# Patient Record
Sex: FEMALE | Race: WHITE | NOT HISPANIC OR LATINO | Employment: OTHER | URBAN - METROPOLITAN AREA
[De-identification: names, ages, dates, MRNs, and addresses within clinical notes are randomized per-mention and may not be internally consistent; named-entity substitution may affect disease eponyms.]

---

## 2024-04-22 ENCOUNTER — OFFICE VISIT (OUTPATIENT)
Dept: PODIATRY | Facility: CLINIC | Age: 64
End: 2024-04-22
Payer: COMMERCIAL

## 2024-04-22 VITALS
WEIGHT: 166 LBS | HEIGHT: 63 IN | HEART RATE: 85 BPM | DIASTOLIC BLOOD PRESSURE: 82 MMHG | RESPIRATION RATE: 16 BRPM | SYSTOLIC BLOOD PRESSURE: 119 MMHG | BODY MASS INDEX: 29.41 KG/M2

## 2024-04-22 DIAGNOSIS — L60.9 ABNORMALITY OF NAIL SURFACE: ICD-10-CM

## 2024-04-22 DIAGNOSIS — L60.0 INGROWN NAIL OF GREAT TOE OF RIGHT FOOT: Primary | ICD-10-CM

## 2024-04-22 DIAGNOSIS — L60.8 INCURVATED NAIL: ICD-10-CM

## 2024-04-22 PROCEDURE — 99202 OFFICE O/P NEW SF 15 MIN: CPT | Mod: S$GLB,,, | Performed by: PODIATRIST

## 2024-04-22 PROCEDURE — 3074F SYST BP LT 130 MM HG: CPT | Mod: S$GLB,,, | Performed by: PODIATRIST

## 2024-04-22 PROCEDURE — 99999 PR PBB SHADOW E&M-NEW PATIENT-LVL IV: CPT | Mod: PBBFAC,,, | Performed by: PODIATRIST

## 2024-04-22 PROCEDURE — 1159F MED LIST DOCD IN RCRD: CPT | Mod: S$GLB,,, | Performed by: PODIATRIST

## 2024-04-22 PROCEDURE — 1160F RVW MEDS BY RX/DR IN RCRD: CPT | Mod: S$GLB,,, | Performed by: PODIATRIST

## 2024-04-22 PROCEDURE — 3079F DIAST BP 80-89 MM HG: CPT | Mod: S$GLB,,, | Performed by: PODIATRIST

## 2024-04-22 PROCEDURE — 3008F BODY MASS INDEX DOCD: CPT | Mod: S$GLB,,, | Performed by: PODIATRIST

## 2024-04-22 RX ORDER — CALCIUM CARBONATE 300MG(750)
400 TABLET,CHEWABLE ORAL
COMMUNITY
Start: 2023-12-26 | End: 2024-04-23 | Stop reason: SDUPTHER

## 2024-04-22 RX ORDER — SOTALOL HYDROCHLORIDE 120 MG/1
120 TABLET ORAL 2 TIMES DAILY
COMMUNITY
Start: 2023-12-16 | End: 2024-04-23

## 2024-04-22 RX ORDER — RIVAROXABAN 20 MG/1
20 TABLET, FILM COATED ORAL
COMMUNITY

## 2024-04-22 RX ORDER — SPIRONOLACTONE 25 MG/1
25 TABLET ORAL
COMMUNITY
Start: 2023-12-26 | End: 2024-04-23

## 2024-04-22 RX ORDER — SACUBITRIL AND VALSARTAN 24; 26 MG/1; MG/1
1 TABLET, FILM COATED ORAL 2 TIMES DAILY
COMMUNITY
Start: 2023-12-26

## 2024-04-22 RX ORDER — NALTREXONE HYDROCHLORIDE 50 MG/1
50 TABLET, FILM COATED ORAL
COMMUNITY
End: 2024-04-23

## 2024-04-22 RX ORDER — ASPIRIN 81 MG/1
81 TABLET ORAL
COMMUNITY
Start: 2023-12-26

## 2024-04-22 RX ORDER — PANTOPRAZOLE SODIUM 20 MG/1
20 TABLET, DELAYED RELEASE ORAL
COMMUNITY
Start: 2023-12-16 | End: 2024-04-23

## 2024-04-22 RX ORDER — METOPROLOL SUCCINATE 25 MG/1
TABLET, EXTENDED RELEASE ORAL
COMMUNITY
End: 2024-04-23 | Stop reason: SDUPTHER

## 2024-04-22 RX ORDER — DIGOXIN 125 MCG
0.12 TABLET ORAL
COMMUNITY
Start: 2024-01-04 | End: 2024-04-23

## 2024-04-22 RX ORDER — METOPROLOL SUCCINATE 25 MG/1
TABLET, EXTENDED RELEASE ORAL
COMMUNITY
Start: 2024-03-13

## 2024-04-22 RX ORDER — TRAZODONE HYDROCHLORIDE 50 MG/1
50 TABLET ORAL NIGHTLY PRN
COMMUNITY

## 2024-04-22 RX ORDER — SERTRALINE HYDROCHLORIDE 100 MG/1
100 TABLET, FILM COATED ORAL
COMMUNITY

## 2024-04-22 RX ORDER — AMIODARONE HYDROCHLORIDE 200 MG/1
400 TABLET ORAL
COMMUNITY
Start: 2023-12-26 | End: 2024-04-23

## 2024-04-22 RX ORDER — SUCRALFATE 1 G/1
1 TABLET ORAL 2 TIMES DAILY
COMMUNITY
Start: 2023-12-16 | End: 2024-04-23

## 2024-04-22 RX ORDER — FUROSEMIDE 20 MG/1
20 TABLET ORAL
COMMUNITY
Start: 2024-01-22

## 2024-04-22 RX ORDER — PROPAFENONE HYDROCHLORIDE 300 MG/1
300 TABLET, COATED ORAL 2 TIMES DAILY
COMMUNITY
Start: 2023-05-12 | End: 2024-04-23

## 2024-04-22 RX ORDER — LANOLIN ALCOHOL/MO/W.PET/CERES
1 CREAM (GRAM) TOPICAL 2 TIMES DAILY
COMMUNITY
Start: 2024-03-27

## 2024-04-24 NOTE — PROGRESS NOTES
Subjective:       Patient ID: Keri Baca is a 63 y.o. female.    Chief Complaint: Ingrown Toenail  Patient presents with complaint of ingrown nails right greater than left for about a year.  Does go for pedicures, right great toenail just seems to be get worse.  No pain unless pressure or in closed toed shoes, denies redness swelling or drainage    Past Medical History:   Diagnosis Date    A-fib     History of alcoholism     History of fracture of toe     Hypertension     Moderate obstructive sleep apnea     Palpitation     Plantar fasciitis     PLMD (periodic limb movement disorder)      Past Surgical History:   Procedure Laterality Date    APPENDECTOMY      CARDIOVERSION      2022-2023     SECTION       Family History   Problem Relation Name Age of Onset    Heart disease Mother      Heart disease Father      Cancer Sister       Social History     Socioeconomic History    Marital status:    Tobacco Use    Smoking status: Former     Types: Cigarettes    Smokeless tobacco: Never   Substance and Sexual Activity    Alcohol use: Not Currently    Drug use: Not Currently     Social Determinants of Health     Financial Resource Strain: Low Risk  (2021)    Received from [facility]    Overall Financial Resource Strain (CARDIA)     Difficulty of Paying Living Expenses: Not hard at all   Food Insecurity: No Food Insecurity (2021)    Received from [facility]    Hunger Vital Sign     Worried About Running Out of Food in the Last Year: Never true     Ran Out of Food in the Last Year: Never true   Transportation Needs: No Transportation Needs (2021)    Received from [facility]    PRAPARE - Transportation     Lack of Transportation (Medical): No     Lack of Transportation (Non-Medical): No   Physical Activity: Inactive (2021)    Received from [facility]  [facility]    Exercise Vital Sign     Days of Exercise per Week: 0 days     Minutes of Exercise per Session: 0 min   Stress: Stress Concern Present (6/25/2021)    Received from [facility]    [place] [employer] - Occupational Stress Questionnaire     Feeling of Stress : Very much   Social Connections: Socially Isolated (6/25/2021)    Received from [facility] and [facility]    Social Connection and Isolation Panel [NHANES]     Frequency of Communication with Friends and Family: More than three times a week     Frequency of Social Gatherings with Friends and Family: More than three times a week     Attends Mandaen Services: Never     Active Member of Clubs or Organizations: No     Attends Club or Organization Meetings: Never     Marital Status:        Current Outpatient Medications   Medication Sig Dispense Refill    aspirin (ECOTRIN) 81 MG EC tablet 81 mg.      magnesium oxide (MAG-OX) 400 mg (241.3 mg magnesium) tablet Take 1 tablet by mouth 2 (two) times daily.      metoprolol succinate (TOPROL-XL) 25 MG 24 hr tablet = 3 tab, Oral, Daily, # 270 tab, 0 Refill(s), Maintenance, Pharmacy: [facility] #[id number], 161, cm, 03/13/24 9:16:00 CDT, Height/Length Measured, 74.4, kg, 03/13/24 9:16:00 CDT, Weight Dosing      sertraline (ZOLOFT) 100 MG tablet Take 100 mg by mouth.      traZODone (DESYREL) 50 MG tablet Take 50 mg by mouth nightly as needed.      XARELTO 20 mg Tab Take 20 mg by mouth.      amiodarone (PACERONE) 200 MG Tab Take 400 mg by mouth. (Patient not taking: Reported on 4/22/2024)      digoxin (LANOXIN) 125 mcg tablet Take 0.125 mg by mouth. (Patient not taking: Reported on 4/22/2024)      ENTRESTO 24-26 mg per tablet Take 1 tablet by mouth 2 (two) times daily.      furosemide (LASIX) 20 MG tablet Take 20 mg by mouth. (Patient not taking: Reported on 4/22/2024)      magnesium oxide 400 mg magnesium Tab 400 mg.  "(Patient not taking: Reported on 4/22/2024)      metoprolol succinate (TOPROL-XL) 25 MG 24 hr tablet Take by mouth. (Patient not taking: Reported on 4/22/2024)      naltrexone (DEPADE) 50 mg tablet Take 50 mg by mouth. (Patient not taking: Reported on 4/22/2024)      pantoprazole (PROTONIX) 20 MG tablet Take 20 mg by mouth. (Patient not taking: Reported on 4/22/2024)      propafenone (RYTHMOL) 300 MG tablet Take 300 mg by mouth 2 (two) times daily. (Patient not taking: Reported on 4/22/2024)      sotaloL (BETAPACE) 120 MG Tab Take 120 mg by mouth 2 (two) times daily. (Patient not taking: Reported on 4/22/2024)      spironolactone (ALDACTONE) 25 MG tablet Take 25 mg by mouth. (Patient not taking: Reported on 4/22/2024)      sucralfate (CARAFATE) 1 gram tablet Take 1 g by mouth 2 (two) times daily. (Patient not taking: Reported on 4/22/2024)       No current facility-administered medications for this visit.     Review of patient's allergies indicates:  No Known Allergies    Review of Systems    Objective:      Vitals:    04/22/24 0940   BP: 119/82   Pulse: 85   Resp: 16   Weight: 75.3 kg (166 lb)   Height: 5' 3" (1.6 m)     Physical Exam       Assessment:       1. Ingrown nail of great toe of right foot    2. Abnormality of nail surface - Right Foot    3. Incurvated nail - Left Foot        Plan:         Reviewed with patient curvature of the nail plate affecting ingrown nail right great toe which has loosened up from the nail bed, this is caused cavity or space to develop the tip of the toenail contributing to worsening ingrown nails on both sides.  Advised to prevent recurrence maintenance to the nail needs to be done on a regular basis to try to change the shape of the nail plate  Showed patient how to reduce the thickness of the nail plate, file across the top every 3-4 days keeping the nail thickness reduced  Reviewed care and maintenance of the ingrown nail trimming the ingrown portions slightly, not aggressively " or deep.  Reviewed how they should be cared for when she goes for pedicure  Ingrown nail removed/debrided both borders right great hallux and mild medial left hallux, no bleeding or drainage present.  Triple antibiotic ointment, coban applied. Not a candidate for nail avulsion at this time.  Instructed patient to leave dressing intact until the morning  Soak warm water and Epson salt in the morning apply antibiotic ointment and a soft bandage leave on throughout the day, soak in the evening a 2nd time and leave to air out overnight.  We did discuss topicals to apply to the nail at night to help keep it soft.  This treatment should be done daily until any residual pain has resolved.  Once pain-free start Vicks vapor rub 2 to 3 times a day both big toenails continuing to reduce thickness of the nail plate for best results  Contact office with any changes or signs of infection  Patient was in understanding and agreement with treatment plan.  I counseled the patient on their conditions, implications and medical management.  Instructed patient to contact the office with any changes, questions, concerns, worsening of symptoms.   Total face to face time 20 minutes, exam, assessment, treatment, discussion, additional time for review of chart prior to and following appointment and visit documentation, consultation and coordination of care.   Follow up as needed    This note was created using M*Modal voice recognition software that occasionally misinterpreted phrases or words.

## 2024-06-21 ENCOUNTER — TELEPHONE (OUTPATIENT)
Dept: HEMATOLOGY/ONCOLOGY | Facility: CLINIC | Age: 64
End: 2024-06-21
Payer: COMMERCIAL

## 2024-06-21 NOTE — NURSING
Patient notified of the scheduled appointment with Dr. Sharma for 06/28/24.  Patient states that she is having a PET on 06/27/24.  Patient instructed to bring a copy of PET to appointment.  Previous CTs uploaded.

## 2024-06-26 NOTE — PROGRESS NOTES
History & Physical    Subjective     History of Present Illness:  Patient is a 63 y.o. female former smoker with NICM (EF 15% echo ) however recovered EF(60% this month), PAF (Xarelto), HTN, RAUL on CPAP here today for evaluation of AMBER NSCLC. Serial scans have shown persistence of a 2.8 cm part solid nodule. Bronchoscopy with biopsy positive for well differentiated adenocarcinoma. There is a calcified RUL nodule. PET completed yesterday, unable to review images. Referral to radiation. Sees Dr. Negro for oncology.     Former smoker. No alcohol presently.   PSH: cholecystectomy, cardioversion,      Chief Complaint   Patient presents with    Consult       Review of patient's allergies indicates:  No Known Allergies    Current Outpatient Medications   Medication Sig Dispense Refill    aspirin (ECOTRIN) 81 MG EC tablet 81 mg.      magnesium oxide (MAG-OX) 400 mg (241.3 mg magnesium) tablet Take 1 tablet by mouth 2 (two) times daily.      metoprolol succinate (TOPROL-XL) 25 MG 24 hr tablet = 3 tab, Oral, Daily, # 270 tab, 0 Refill(s), Maintenance, Pharmacy: [facility] #[id number], 161, cm, 24 9:16:00 CDT, Height/Length Measured, 74.4, kg, 24 9:16:00 CDT, Weight Dosing      sertraline (ZOLOFT) 100 MG tablet Take 100 mg by mouth.      traZODone (DESYREL) 50 MG tablet Take 50 mg by mouth nightly as needed.      XARELTO 20 mg Tab Take 20 mg by mouth.       No current facility-administered medications for this visit.       Past Medical History:   Diagnosis Date    A-fib     History of alcoholism     History of fracture of toe     Hypertension     Moderate obstructive sleep apnea     Palpitation     Plantar fasciitis     PLMD (periodic limb movement disorder)      Past Surgical History:   Procedure Laterality Date    APPENDECTOMY      CARDIOVERSION      2022-2023     SECTION       Family History   Problem Relation Name Age of Onset    Heart disease Mother      Heart disease  "Father      Cancer Sister       Social History     Tobacco Use    Smoking status: Former     Types: Cigarettes    Smokeless tobacco: Never   Substance Use Topics    Alcohol use: Not Currently    Drug use: Not Currently        Review of Systems:  Review of Systems   Constitutional:  Negative for activity change, appetite change and fatigue.   Respiratory:  Negative for cough and shortness of breath.    Cardiovascular:  Negative for palpitations and leg swelling.   Gastrointestinal:  Negative for abdominal pain.   Genitourinary:  Negative for difficulty urinating.   Musculoskeletal:  Negative for arthralgias.   Neurological:  Negative for dizziness and syncope.   Psychiatric/Behavioral:  Negative for agitation.           Objective     Vital Signs (Most Recent)  Vitals:    06/28/24 1120   BP: (!) 153/96   Pulse: 90   SpO2: 96%   Weight: 76.5 kg (168 lb 10.4 oz)   Height: 5' 3" (1.6 m)   PainSc: 0-No pain           Physical Exam:  Physical Exam  Constitutional:       Appearance: Normal appearance.   Cardiovascular:      Rate and Rhythm: Normal rate and regular rhythm.   Pulmonary:      Breath sounds: Normal breath sounds.   Abdominal:      Palpations: Abdomen is soft.   Musculoskeletal:         General: Normal range of motion.      Cervical back: Normal range of motion.      Right lower leg: No edema.      Left lower leg: No edema.   Skin:     General: Skin is warm and dry.   Neurological:      General: No focal deficit present.      Mental Status: She is alert and oriented to person, place, and time.   Psychiatric:         Mood and Affect: Mood normal.         Behavior: Behavior normal.         PFTS  FEV1: 2.17L  95%  DLCO: 16.8   81%         Outside PET - unable to upload     Assessment and Plan     Patient is a 63 y.o. female former smoker with NICM (EF 15% echo 2023) however recovered EF(60% this month), PAF (Xarelto), HTN, RAUL on CPAP here today for evaluation of AMBER NSCLC - well differentiated adenocarcinoma. "     PLAN:    Pre op labs today. Upload PET for review. If disease remains localized to nodule plan for left robotic assisted upper division with MLND, possible thoracotomy. Discuss in tumor board.   Appropriate patient education regarding the dianelys-operative period as well as intraoperative details were discussed. Risks, including but not limited to, bleeding, infection, pain and anesthetic complication were discussed. Patient was given the opportunity to ask questions and to have those questions answered to their satisfaction. Patient verbalized understanding to both procedure and associated risks. Consent was obtained.

## 2024-06-26 NOTE — H&P (VIEW-ONLY)
History & Physical    Subjective     History of Present Illness:  Patient is a 63 y.o. female former smoker with NICM (EF 15% echo ) however recovered EF(60% this month), PAF (Xarelto), HTN, RAUL on CPAP here today for evaluation of AMBER NSCLC. Serial scans have shown persistence of a 2.8 cm part solid nodule. Bronchoscopy with biopsy positive for well differentiated adenocarcinoma. There is a calcified RUL nodule. PET completed yesterday, unable to review images. Referral to radiation. Sees Dr. Negro for oncology.     Former smoker. No alcohol presently.   PSH: cholecystectomy, cardioversion,      Chief Complaint   Patient presents with    Consult       Review of patient's allergies indicates:  No Known Allergies    Current Outpatient Medications   Medication Sig Dispense Refill    aspirin (ECOTRIN) 81 MG EC tablet 81 mg.      magnesium oxide (MAG-OX) 400 mg (241.3 mg magnesium) tablet Take 1 tablet by mouth 2 (two) times daily.      metoprolol succinate (TOPROL-XL) 25 MG 24 hr tablet = 3 tab, Oral, Daily, # 270 tab, 0 Refill(s), Maintenance, Pharmacy: [facility] #[id number], 161, cm, 24 9:16:00 CDT, Height/Length Measured, 74.4, kg, 24 9:16:00 CDT, Weight Dosing      sertraline (ZOLOFT) 100 MG tablet Take 100 mg by mouth.      traZODone (DESYREL) 50 MG tablet Take 50 mg by mouth nightly as needed.      XARELTO 20 mg Tab Take 20 mg by mouth.       No current facility-administered medications for this visit.       Past Medical History:   Diagnosis Date    A-fib     History of alcoholism     History of fracture of toe     Hypertension     Moderate obstructive sleep apnea     Palpitation     Plantar fasciitis     PLMD (periodic limb movement disorder)      Past Surgical History:   Procedure Laterality Date    APPENDECTOMY      CARDIOVERSION      2022-2023     SECTION       Family History   Problem Relation Name Age of Onset    Heart disease Mother      Heart disease  "Father      Cancer Sister       Social History     Tobacco Use    Smoking status: Former     Types: Cigarettes    Smokeless tobacco: Never   Substance Use Topics    Alcohol use: Not Currently    Drug use: Not Currently        Review of Systems:  Review of Systems   Constitutional:  Negative for activity change, appetite change and fatigue.   Respiratory:  Negative for cough and shortness of breath.    Cardiovascular:  Negative for palpitations and leg swelling.   Gastrointestinal:  Negative for abdominal pain.   Genitourinary:  Negative for difficulty urinating.   Musculoskeletal:  Negative for arthralgias.   Neurological:  Negative for dizziness and syncope.   Psychiatric/Behavioral:  Negative for agitation.           Objective     Vital Signs (Most Recent)  Vitals:    06/28/24 1120   BP: (!) 153/96   Pulse: 90   SpO2: 96%   Weight: 76.5 kg (168 lb 10.4 oz)   Height: 5' 3" (1.6 m)   PainSc: 0-No pain           Physical Exam:  Physical Exam  Constitutional:       Appearance: Normal appearance.   Cardiovascular:      Rate and Rhythm: Normal rate and regular rhythm.   Pulmonary:      Breath sounds: Normal breath sounds.   Abdominal:      Palpations: Abdomen is soft.   Musculoskeletal:         General: Normal range of motion.      Cervical back: Normal range of motion.      Right lower leg: No edema.      Left lower leg: No edema.   Skin:     General: Skin is warm and dry.   Neurological:      General: No focal deficit present.      Mental Status: She is alert and oriented to person, place, and time.   Psychiatric:         Mood and Affect: Mood normal.         Behavior: Behavior normal.         PFTS  FEV1: 2.17L  95%  DLCO: 16.8   81%         Outside PET - unable to upload     Assessment and Plan     Patient is a 63 y.o. female former smoker with NICM (EF 15% echo 2023) however recovered EF(60% this month), PAF (Xarelto), HTN, RAUL on CPAP here today for evaluation of AMBER NSCLC - well differentiated adenocarcinoma. "     PLAN:    Pre op labs today. Upload PET for review. If disease remains localized to nodule plan for left robotic assisted upper division with MLND, possible thoracotomy. Discuss in tumor board.   Appropriate patient education regarding the dianelys-operative period as well as intraoperative details were discussed. Risks, including but not limited to, bleeding, infection, pain and anesthetic complication were discussed. Patient was given the opportunity to ask questions and to have those questions answered to their satisfaction. Patient verbalized understanding to both procedure and associated risks. Consent was obtained.

## 2024-06-28 ENCOUNTER — OFFICE VISIT (OUTPATIENT)
Dept: CARDIOTHORACIC SURGERY | Facility: CLINIC | Age: 64
End: 2024-06-28
Payer: COMMERCIAL

## 2024-06-28 VITALS
OXYGEN SATURATION: 96 % | HEART RATE: 90 BPM | WEIGHT: 168.63 LBS | SYSTOLIC BLOOD PRESSURE: 153 MMHG | HEIGHT: 63 IN | DIASTOLIC BLOOD PRESSURE: 96 MMHG | BODY MASS INDEX: 29.88 KG/M2

## 2024-06-28 DIAGNOSIS — Z01.818 PRE-OP EVALUATION: Primary | ICD-10-CM

## 2024-06-28 DIAGNOSIS — C34.12 MALIGNANT NEOPLASM OF UPPER LOBE OF LEFT LUNG: Primary | ICD-10-CM

## 2024-06-28 DIAGNOSIS — C34.92 NON-SMALL CELL CANCER OF LEFT LUNG: ICD-10-CM

## 2024-06-28 DIAGNOSIS — D68.9 COAGULOPATHY: ICD-10-CM

## 2024-06-28 DIAGNOSIS — C34.12 MALIGNANT NEOPLASM OF UPPER LOBE OF LEFT LUNG: ICD-10-CM

## 2024-06-28 PROBLEM — C34.10 MALIGNANT NEOPLASM OF UPPER LOBE OF LUNG: Status: ACTIVE | Noted: 2024-06-28

## 2024-06-28 PROCEDURE — 99999 PR PBB SHADOW E&M-EST. PATIENT-LVL III: CPT | Mod: PBBFAC,,, | Performed by: THORACIC SURGERY (CARDIOTHORACIC VASCULAR SURGERY)

## 2024-07-01 ENCOUNTER — TELEPHONE (OUTPATIENT)
Dept: CARDIOTHORACIC SURGERY | Facility: CLINIC | Age: 64
End: 2024-07-01
Payer: COMMERCIAL

## 2024-07-01 NOTE — TELEPHONE ENCOUNTER
Called and left message for Dr Balderas's nurse to see if they are able to schedule a PET stress or Lexiscan in [place]. Left detailed message with callback number.    Julie Haase RN  [facility] Nurse Navigator [phone]

## 2024-07-01 NOTE — TELEPHONE ENCOUNTER
Spoke with Radha in main lab at [facility]. She asked if I would re-fax orders for lab work to main lab number, [phone]. Orders re faxed.    Julie Haase RN  [facility] Nurse Navigator [phone]

## 2024-07-01 NOTE — TELEPHONE ENCOUNTER
Spoke with May from Dr Balderas's office. Discussed need for PET stress or Lexiscan prior to surgery on 7/23. She will discuss with Dr Balderas to see if they are able to order and perform the imaging and will call me back.    Julie Haase RN  [facility] Nurse Navigator [phone]

## 2024-07-02 ENCOUNTER — DOCUMENTATION ONLY (OUTPATIENT)
Dept: CARDIOTHORACIC SURGERY | Facility: CLINIC | Age: 64
End: 2024-07-02
Payer: COMMERCIAL

## 2024-07-02 ENCOUNTER — TELEPHONE (OUTPATIENT)
Dept: CARDIOTHORACIC SURGERY | Facility: CLINIC | Age: 64
End: 2024-07-02
Payer: COMMERCIAL

## 2024-07-02 NOTE — TELEPHONE ENCOUNTER
Called pt to find out the location and date of her PET scan. She had the scan on 6/27 and brought the disc to her appointment. Scan was completed at [facility].    Julie Haase RN  [facility] Nurse Navigator [phone]     Pharmacy did not receive previous refill.

## 2024-07-02 NOTE — TELEPHONE ENCOUNTER
Asked Dr Balderas's office to fax echo. Gave  updated number. Left message for his nurse to call and let me know if they are able to complete a PET stress. They will call me back.    Julie Haase RN  [facility] Nurse Navigator [phone]

## 2024-07-02 NOTE — PROGRESS NOTES
Spoke with Radha in lab at [facility] to follow up on patient's lab orders. They have not received pt's lab orders. Re-faxed orders.    Julie Haase RN  [facility] Nurse Navigator [phone]

## 2024-07-08 ENCOUNTER — TELEPHONE (OUTPATIENT)
Dept: CARDIOTHORACIC SURGERY | Facility: CLINIC | Age: 64
End: 2024-07-08
Payer: COMMERCIAL

## 2024-07-08 DIAGNOSIS — C34.12 MALIGNANT NEOPLASM OF UPPER LOBE OF LEFT LUNG: Primary | ICD-10-CM

## 2024-07-08 NOTE — TELEPHONE ENCOUNTER
Called to see if patient has been scheduled for stress test. Pt has been scheduled via Dr Balderas's office for 7/30/2024 in [place].    Julie Haase RN  [facility] Nurse Navigator [phone]

## 2024-07-08 NOTE — TELEPHONE ENCOUNTER
Spoke with patient and let her know if she would like to keep surgery on 7/23, we are able to do the stress test here on 7/17. If should would prefer to do the testing closer to her home, we will move the surgery to early August. She would prefer to do testing at home. Will discuss surgery dates with Dr Sharma and get back with patient.    Pt also asked about labwork. Have confirmed with Latasha at [facility] lab that they have orders. I told her they have the orders at the outpatient lab for her. She will go and get blood drawn.    Julie Haase RN  [facility] Nurse Navigator [phone]        ----- Message from Reina Colvin sent at 7/8/2024  3:56 PM CDT -----  Regarding: pt advice  Contact: pt [phone]  Type:  Patient Returning Call    Who Called:pt   Who Left Message for Patient:Norma  Does the patient know what this is regarding?:Stress Test   Would the patient rather a call back or a response via [facility]? Callback   Best Call Back Number:[phone]

## 2024-07-11 DIAGNOSIS — Z01.810 PRE-OPERATIVE CARDIOVASCULAR EXAMINATION: Primary | ICD-10-CM

## 2024-07-12 ENCOUNTER — TELEPHONE (OUTPATIENT)
Dept: CARDIOLOGY | Facility: HOSPITAL | Age: 64
End: 2024-07-12

## 2024-07-15 ENCOUNTER — CLINICAL SUPPORT (OUTPATIENT)
Dept: CARDIOLOGY | Facility: HOSPITAL | Age: 64
End: 2024-07-15
Attending: PHYSICIAN ASSISTANT
Payer: COMMERCIAL

## 2024-07-15 ENCOUNTER — HOSPITAL ENCOUNTER (OUTPATIENT)
Dept: RADIOLOGY | Facility: HOSPITAL | Age: 64
Discharge: HOME | End: 2024-07-15
Attending: PHYSICIAN ASSISTANT
Payer: COMMERCIAL

## 2024-07-15 VITALS — HEIGHT: 63 IN | WEIGHT: 168.63 LBS | BODY MASS INDEX: 29.88 KG/M2

## 2024-07-15 DIAGNOSIS — Z01.810 PRE-OPERATIVE CARDIOVASCULAR EXAMINATION: ICD-10-CM

## 2024-07-15 LAB
CV PHARM DOSE: 0.4 MG
CV STRESS BASE HR: 87 BPM
DIASTOLIC BLOOD PRESSURE: 86 MMHG
OHS CV CPX 1 MINUTE RECOVERY HEART RATE: 97 BPM
OHS CV CPX 85 PERCENT MAX PREDICTED HEART RATE MALE: 133
OHS CV CPX MAX PREDICTED HEART RATE: 157
OHS CV CPX PATIENT IS FEMALE: 1
OHS CV CPX PATIENT IS MALE: 0
OHS CV CPX PEAK DIASTOLIC BLOOD PRESSURE: 99 MMHG
OHS CV CPX PEAK HEAR RATE: 132 BPM
OHS CV CPX PEAK RATE PRESSURE PRODUCT: NORMAL
OHS CV CPX PEAK SYSTOLIC BLOOD PRESSURE: 153 MMHG
OHS CV CPX PERCENT MAX PREDICTED HEART RATE ACHIEVED: 88
OHS CV CPX RATE PRESSURE PRODUCT PRESENTING: NORMAL
SYSTOLIC BLOOD PRESSURE: 151 MMHG

## 2024-07-15 PROCEDURE — 93017 CV STRESS TEST TRACING ONLY: CPT

## 2024-07-15 PROCEDURE — 93018 CV STRESS TEST I&R ONLY: CPT | Mod: ,,, | Performed by: GENERAL PRACTICE

## 2024-07-15 PROCEDURE — 63600175 PHARM REV CODE 636 W HCPCS: Performed by: PHYSICIAN ASSISTANT

## 2024-07-15 PROCEDURE — 78452 HT MUSCLE IMAGE SPECT MULT: CPT | Mod: TC

## 2024-07-15 PROCEDURE — A9502 TC99M TETROFOSMIN: HCPCS | Performed by: PHYSICIAN ASSISTANT

## 2024-07-15 PROCEDURE — 93306 TTE W/DOPPLER COMPLETE: CPT

## 2024-07-15 PROCEDURE — 93016 CV STRESS TEST SUPVJ ONLY: CPT | Mod: ,,, | Performed by: GENERAL PRACTICE

## 2024-07-15 PROCEDURE — 93306 TTE W/DOPPLER COMPLETE: CPT | Mod: 26,,, | Performed by: GENERAL PRACTICE

## 2024-07-15 RX ORDER — REGADENOSON 0.08 MG/ML
0.4 INJECTION, SOLUTION INTRAVENOUS ONCE
Status: COMPLETED | OUTPATIENT
Start: 2024-07-15 | End: 2024-07-15

## 2024-07-15 RX ADMIN — TETROFOSMIN 11 MILLICURIE: 1.38 INJECTION, POWDER, LYOPHILIZED, FOR SOLUTION INTRAVENOUS at 08:07

## 2024-07-15 RX ADMIN — REGADENOSON 0.4 MG: 0.08 INJECTION, SOLUTION INTRAVENOUS at 09:07

## 2024-07-15 RX ADMIN — TETROFOSMIN 24 MILLICURIE: 1.38 INJECTION, POWDER, LYOPHILIZED, FOR SOLUTION INTRAVENOUS at 09:07

## 2024-07-15 NOTE — NURSING NOTE
Echocardiogram and Lexiscan portion of Stress Test completed without complications. Patient verbalized understanding of pre-post test instructions. Discharged from lab per Cardiology.

## 2024-07-16 LAB
AORTIC ROOT ANNULUS: 2.3 CM
AORTIC VALVE CUSP SEPERATION: 1.8 CM
APICAL FOUR CHAMBER EJECTION FRACTION: 51 %
ASCENDING AORTA: 3 CM
AV INDEX (PROSTH): 0.81
AV MEAN GRADIENT: 2 MMHG
AV PEAK GRADIENT: 3 MMHG
AV VALVE AREA BY VELOCITY RATIO: 2.7 CM²
AV VALVE AREA: 2.56 CM²
AV VELOCITY RATIO: 0.86
BSA FOR ECHO PROCEDURE: 1.84 M2
CV ECHO LV RWT: 0.4 CM
DOP CALC AO PEAK VEL: 0.86 M/S
DOP CALC AO VTI: 17.8 CM
DOP CALC LVOT AREA: 3.1 CM2
DOP CALC LVOT DIAMETER: 2 CM
DOP CALC LVOT PEAK VEL: 0.74 M/S
DOP CALC LVOT STROKE VOLUME: 45.53 CM3
DOP CALC MV VTI: 20.3 CM
DOP CALCLVOT PEAK VEL VTI: 14.5 CM
E WAVE DECELERATION TIME: 130 MSEC
E/A RATIO: 2.42
E/E' RATIO: 7.36 M/S
ECHO LV POSTERIOR WALL: 0.89 CM (ref 0.6–1.1)
FRACTIONAL SHORTENING: 23 % (ref 28–44)
INTERVENTRICULAR SEPTUM: 0.85 CM (ref 0.6–1.1)
IVC DIAMETER: 2.05 CM
LEFT ATRIUM AREA SYSTOLIC (APICAL 4 CHAMBER): 19.3 CM2
LEFT ATRIUM SIZE: 3.5 CM
LEFT INTERNAL DIMENSION IN SYSTOLE: 3.43 CM (ref 2.1–4)
LEFT VENTRICLE DIASTOLIC VOLUME INDEX: 50.06 ML/M2
LEFT VENTRICLE DIASTOLIC VOLUME: 90.1 ML
LEFT VENTRICLE END DIASTOLIC VOLUME APICAL 4 CHAMBER: 91.7 ML
LEFT VENTRICLE END SYSTOLIC VOLUME APICAL 4 CHAMBER: 47 ML
LEFT VENTRICLE MASS INDEX: 69 G/M2
LEFT VENTRICLE SYSTOLIC VOLUME INDEX: 26.9 ML/M2
LEFT VENTRICLE SYSTOLIC VOLUME: 48.5 ML
LEFT VENTRICULAR INTERNAL DIMENSION IN DIASTOLE: 4.45 CM (ref 3.5–6)
LEFT VENTRICULAR MASS: 124.61 G
LV LATERAL E/E' RATIO: 6.57 M/S
LV SEPTAL E/E' RATIO: 8.36 M/S
LVED V (TEICH): 90.1 ML
LVES V (TEICH): 48.5 ML
LVOT MG: 1 MMHG
LVOT MV: 0.48 CM/S
MV MEAN GRADIENT: 2 MMHG
MV PEAK A VEL: 0.38 M/S
MV PEAK E VEL: 0.92 M/S
MV PEAK GRADIENT: 4 MMHG
MV STENOSIS PRESSURE HALF TIME: 52 MS
MV VALVE AREA BY CONTINUITY EQUATION: 2.24 CM2
MV VALVE AREA P 1/2 METHOD: 4.23 CM2
OHS CV RV/LV RATIO: 0.6 CM
PISA MRMAX VEL: 5.31 M/S
PISA TR MAX VEL: 2.91 M/S
PV MV: 0.47 M/S
PV PEAK GRADIENT: 2 MMHG
PV PEAK VELOCITY: 0.64 M/S
RA PRESSURE ESTIMATED: 3 MMHG
RIGHT ATRIUM VOLUME AREA LENGTH APICAL 4 CHAMBER: 33.4 ML
RIGHT VENTRICULAR END-DIASTOLIC DIMENSION: 2.69 CM
RV TB RVSP: 6 MMHG
RV TISSUE DOPPLER FREE WALL SYSTOLIC VELOCITY 1 (APICAL 4 CHAMBER VIEW): 9.79 CM/S
TDI LATERAL: 0.14 M/S
TDI SEPTAL: 0.11 M/S
TDI: 0.13 M/S
TR MAX PG: 34 MMHG
TRICUSPID ANNULAR PLANE SYSTOLIC EXCURSION: 0.78 CM
TV REST PULMONARY ARTERY PRESSURE: 37 MMHG
Z-SCORE OF LEFT VENTRICULAR DIMENSION IN END DIASTOLE: -1.12
Z-SCORE OF LEFT VENTRICULAR DIMENSION IN END SYSTOLE: 0.86

## 2024-07-19 ENCOUNTER — LAB VISIT (OUTPATIENT)
Dept: LAB | Facility: HOSPITAL | Age: 64
End: 2024-07-19
Attending: PHYSICIAN ASSISTANT
Payer: COMMERCIAL

## 2024-07-19 ENCOUNTER — TELEPHONE (OUTPATIENT)
Dept: CARDIOTHORACIC SURGERY | Facility: CLINIC | Age: 64
End: 2024-07-19
Payer: COMMERCIAL

## 2024-07-19 DIAGNOSIS — Z01.818 PRE-OP EVALUATION: ICD-10-CM

## 2024-07-19 DIAGNOSIS — D68.9 COAGULOPATHY: ICD-10-CM

## 2024-07-19 LAB
ALBUMIN SERPL BCP-MCNC: 4.1 G/DL (ref 3.5–5.2)
ALP SERPL-CCNC: 75 U/L (ref 55–135)
ALT SERPL W/O P-5'-P-CCNC: 39 U/L (ref 10–44)
ANION GAP SERPL CALC-SCNC: 9 MMOL/L (ref 8–16)
APTT PPP: 37.6 SEC (ref 21–32)
AST SERPL-CCNC: 32 U/L (ref 10–40)
BASOPHILS # BLD AUTO: 0.02 K/UL (ref 0–0.2)
BASOPHILS NFR BLD: 0.4 % (ref 0–1.9)
BILIRUB SERPL-MCNC: 0.5 MG/DL (ref 0.1–1)
BUN SERPL-MCNC: 15 MG/DL (ref 8–23)
CALCIUM SERPL-MCNC: 9.5 MG/DL (ref 8.7–10.5)
CHLORIDE SERPL-SCNC: 104 MMOL/L (ref 95–110)
CO2 SERPL-SCNC: 25 MMOL/L (ref 23–29)
CREAT SERPL-MCNC: 0.9 MG/DL (ref 0.5–1.4)
DIFFERENTIAL METHOD BLD: ABNORMAL
EOSINOPHIL # BLD AUTO: 0.1 K/UL (ref 0–0.5)
EOSINOPHIL NFR BLD: 2.4 % (ref 0–8)
ERYTHROCYTE [DISTWIDTH] IN BLOOD BY AUTOMATED COUNT: 13.4 % (ref 11.5–14.5)
EST. GFR  (NO RACE VARIABLE): >60 ML/MIN/1.73 M^2
GLUCOSE SERPL-MCNC: 148 MG/DL (ref 70–110)
HCT VFR BLD AUTO: 39.3 % (ref 37–48.5)
HGB BLD-MCNC: 12.7 G/DL (ref 12–16)
IMM GRANULOCYTES # BLD AUTO: 0.02 K/UL (ref 0–0.04)
IMM GRANULOCYTES NFR BLD AUTO: 0.4 % (ref 0–0.5)
INR PPP: 1.4 (ref 0.8–1.2)
LYMPHOCYTES # BLD AUTO: 1 K/UL (ref 1–4.8)
LYMPHOCYTES NFR BLD: 21.3 % (ref 18–48)
MCH RBC QN AUTO: 29.1 PG (ref 27–31)
MCHC RBC AUTO-ENTMCNC: 32.3 G/DL (ref 32–36)
MCV RBC AUTO: 90 FL (ref 82–98)
MONOCYTES # BLD AUTO: 0.4 K/UL (ref 0.3–1)
MONOCYTES NFR BLD: 8.2 % (ref 4–15)
NEUTROPHILS # BLD AUTO: 3.1 K/UL (ref 1.8–7.7)
NEUTROPHILS NFR BLD: 67.3 % (ref 38–73)
NRBC BLD-RTO: 0 /100 WBC
PLATELET # BLD AUTO: 132 K/UL (ref 150–450)
PMV BLD AUTO: 10.2 FL (ref 9.2–12.9)
POTASSIUM SERPL-SCNC: 4.6 MMOL/L (ref 3.5–5.1)
PROT SERPL-MCNC: 7.7 G/DL (ref 6–8.4)
PROTHROMBIN TIME: 14.8 SEC (ref 9–12.5)
RBC # BLD AUTO: 4.36 M/UL (ref 4–5.4)
SODIUM SERPL-SCNC: 138 MMOL/L (ref 136–145)
WBC # BLD AUTO: 4.64 K/UL (ref 3.9–12.7)

## 2024-07-19 PROCEDURE — 85610 PROTHROMBIN TIME: CPT | Performed by: PHYSICIAN ASSISTANT

## 2024-07-19 PROCEDURE — 36415 COLL VENOUS BLD VENIPUNCTURE: CPT | Performed by: PHYSICIAN ASSISTANT

## 2024-07-19 PROCEDURE — 85025 COMPLETE CBC W/AUTO DIFF WBC: CPT | Performed by: PHYSICIAN ASSISTANT

## 2024-07-19 PROCEDURE — 80053 COMPREHEN METABOLIC PANEL: CPT | Performed by: PHYSICIAN ASSISTANT

## 2024-07-19 PROCEDURE — 85730 THROMBOPLASTIN TIME PARTIAL: CPT | Performed by: PHYSICIAN ASSISTANT

## 2024-07-19 NOTE — TELEPHONE ENCOUNTER
Received pt's lab work. Care team would like labwork to be re-drawn.    Called patient and let her know blood work needed to be recollected and she should go to the [facility] in [facility] to get the blood drawn. Emphasized she needed to go today.    Pt understands importance of this and will go get blood drawn this morning.    Julie Haase RN  [facility] Nurse Navigator [phone]

## 2024-07-19 NOTE — TELEPHONE ENCOUNTER
Called pt to notify her of updated lab results. Let her know INR was much lower and we will recheck it again on Tuesday morning.    Instructed pt to stop all supplements/multivitamin. Pt denies using OTC supplements, vitamins, and herbal remedies/teas.     Julie Haase RN  [facility] Nurse Navigator [phone]

## 2024-07-22 ENCOUNTER — TELEPHONE (OUTPATIENT)
Dept: CARDIOTHORACIC SURGERY | Facility: CLINIC | Age: 64
End: 2024-07-22
Payer: COMMERCIAL

## 2024-07-22 ENCOUNTER — ANESTHESIA EVENT (OUTPATIENT)
Dept: SURGERY | Facility: HOSPITAL | Age: 64
End: 2024-07-22
Payer: COMMERCIAL

## 2024-07-22 NOTE — ANESTHESIA PREPROCEDURE EVALUATION
[facility]  Anesthesia Pre-Operative Evaluation         Patient Name: Keri Baca  YOB: 1960  MRN: [id number]    SUBJECTIVE:     Pre-operative evaluation for Procedure(s) (LRB):  XI ROBOTIC  LOBECTOMY,LUNG LEFT UPPER (Left)  LYMPHADENECTOMY (N/A)     07/22/2024    Keri Baca is a 63 y.o. female w/ a significant PMHx of MICM with prior EF of 15% with recovery to 60% on last echo, mild/moderate MR, mild/moderate TR, prior smoking hx, prior ETOH abuse, PAF on Xarelto, and NSCLC who now presents for the above procedure(s).    Stress Test: 7/15/24  The ECG portion of the study is negative for ischemia.    The patient reported chest pain during the stress test.    During stress, rare PACs are noted.    Echo: 7/15/24  Left Ventricle: The left ventricle is normal in size. Normal wall thickness. There is normal systolic function. Grade III diastolic dysfunction. E/A ratio is 2.42.    Right Ventricle: Normal right ventricular cavity size. Wall thickness is normal. Systolic function is normal.    Left Atrium: Left atrium is mildly dilated.    Mitral Valve: There is mild to moderate regurgitation.    Tricuspid Valve: There is mild to moderate regurgitation. There is mild pulmonary hypertension. The estimated PA systolic pressure is at least 34 mmHg.    IVC/SVC: Normal venous pressure at 3 mmHg.    LDA:  none documented     Prev airway:     Drips: None documented.    Patient Active Problem List   Diagnosis    Malignant neoplasm of upper lobe of lung       Review of patient's allergies indicates:  No Known Allergies    Current Outpatient Medications:  No current facility-administered medications for this encounter.    Current Outpatient Medications:     aspirin (ECOTRIN) 81 MG EC tablet, 81 mg., Disp: , Rfl:     magnesium oxide (MAG-OX) 400 mg (241.3 mg magnesium) tablet, Take 1 tablet by mouth 2 (two) times daily., Disp: , Rfl:     metoprolol succinate (TOPROL-XL) 25 MG 24 hr tablet, = 3  tab, Oral, Daily, # 270 tab, 0 Refill(s), Maintenance, Pharmacy: [facility][id number], 161, cm, 24 9:16:00 CDT, Height/Length Measured, 74.4, kg, 24 9:16:00 CDT, Weight Dosing, Disp: , Rfl:     sertraline (ZOLOFT) 100 MG tablet, Take 100 mg by mouth every evening., Disp: , Rfl:     traZODone (DESYREL) 50 MG tablet, Take 50 mg by mouth nightly as needed., Disp: , Rfl:     XARELTO 20 mg Tab, Take 20 mg by mouth nightly., Disp: , Rfl:     Past Surgical History:   Procedure Laterality Date    APPENDECTOMY      CARDIOVERSION      2022-2023     SECTION         Social History     Socioeconomic History    Marital status:    Tobacco Use    Smoking status: Former     Types: Cigarettes    Smokeless tobacco: Never   Substance and Sexual Activity    Alcohol use: Not Currently    Drug use: Not Currently     Social Determinants of Health     Financial Resource Strain: Low Risk  (2024)    Overall Financial Resource Strain (CARDIA)     Difficulty of Paying Living Expenses: Not very hard   Food Insecurity: No Food Insecurity (2024)    Hunger Vital Sign     Worried About Running Out of Food in the Last Year: Never true     Ran Out of Food in the Last Year: Never true   Transportation Needs: No Transportation Needs (2021)    Received from [facility] and [facility] - Transportation     Lack of Transportation (Medical): No     Lack of Transportation (Non-Medical): No   Physical Activity: Insufficiently Active (2024)    Exercise Vital Sign     Days of Exercise per Week: 3 days     Minutes of Exercise per Session: 40 min   Stress: Stress Concern Present (2024)    [employer] [place] [employer] - Occupational Stress Questionnaire     Feeling of Stress : To some extent   Housing Stability: Unknown (2024)    Housing Stability Vital Sign     Unable to Pay for Housing in the Last Year: No       OBJECTIVE:     Vital Signs Range  (Last 24H):         Significant Labs:  Lab Results   Component Value Date    WBC 4.64 07/19/2024    HGB 12.7 07/19/2024    HCT 39.3 07/19/2024     (L) 07/19/2024    ALT 39 07/19/2024    AST 32 07/19/2024     07/19/2024    K 4.6 07/19/2024     07/19/2024    CREATININE 0.9 07/19/2024    BUN 15 07/19/2024    CO2 25 07/19/2024    INR 1.4 (H) 07/19/2024       Diagnostic Studies: No relevant studies.    EKG:   No results found for this or any previous visit.    2D ECHO:  TTE:  No results found for this or any previous visit.    SANDEE:  No results found for this or any previous visit.    ASSESSMENT/PLAN:                                                                                                                  07/22/2024  Keri Baca is a 63 y.o., female.      Pre-op Assessment    I have reviewed the Patient Summary Reports.     I have reviewed the Nursing Notes. I have reviewed the NPO Status.   I have reviewed the Medications.     Review of Systems  Anesthesia Hx:  No problems with previous Anesthesia   History of prior surgery of interest to airway management or planning:          Denies Family Hx of Anesthesia complications.    Denies Personal Hx of Anesthesia complications.                    Social:  Former Smoker, Alcohol Use       Hematology/Oncology:                      Current/Recent Cancer.                Cardiovascular:     Hypertension Valvular problems/Murmurs, MR  Denies MI.  Denies CAD.    Dysrhythmias atrial fibrillation Angina     hyperlipidemia                             Pulmonary:   COPD     Sleep Apnea                Hepatic/GI:      Denies GERD.             Endocrine:        Denies Morbid Obesity / BMI > 40      Physical Exam  General: Well nourished, Cooperative, Alert and Oriented    Airway:  Mallampati: III / II  Mouth Opening: Normal  TM Distance: Normal  Tongue: Normal  Neck ROM: Normal ROM    Dental:  Intact, Periodontal disease    Chest/Lungs:  Clear to auscultation,  Normal Respiratory Rate    Heart:  Rate: Normal  Rhythm: Regular Rhythm        Anesthesia Plan  Type of Anesthesia, risks & benefits discussed:    Anesthesia Type: Gen ETT  Intra-op Monitoring Plan: Standard ASA Monitors and Art Line  Post Op Pain Control Plan: multimodal analgesia and IV/PO Opioids PRN  Induction:  IV  Airway Plan: Video and Fiberoptic, Post-Induction  Informed Consent: Informed consent signed with the Patient and all parties understand the risks and agree with anesthesia plan.  All questions answered.   ASA Score: 3  Day of Surgery Review of History & Physical: H&P Update referred to the surgeon/provider.  Anesthesia Plan Notes: Chart reviewed. Patient seen and examined. Anesthesia plan discussed and questions answered. E-consent signed. Abraham Saenz MD    Ready For Surgery From Anesthesia Perspective.     .

## 2024-07-22 NOTE — TELEPHONE ENCOUNTER
Pt informed of arrival time for surgery.  Pt instructed to report to [facility] at 5:00 tomorrow morning.  Pt reminded to perform Hibiclens shower tonight and tomorrow morning, and to become NPO at midnight.  Instructed pt to hold ASA tomorrow and take all other medication as directed.    Pt verbalized understanding.     Julie Haase RN  CTS Nurse Navigator [phone]

## 2024-07-23 ENCOUNTER — ANESTHESIA (OUTPATIENT)
Dept: SURGERY | Facility: HOSPITAL | Age: 64
End: 2024-07-23
Payer: COMMERCIAL

## 2024-07-23 ENCOUNTER — HOSPITAL ENCOUNTER (INPATIENT)
Facility: HOSPITAL | Age: 64
LOS: 2 days | Discharge: HOME | DRG: 164 | End: 2024-07-25
Attending: THORACIC SURGERY (CARDIOTHORACIC VASCULAR SURGERY) | Admitting: THORACIC SURGERY (CARDIOTHORACIC VASCULAR SURGERY)
Payer: COMMERCIAL

## 2024-07-23 DIAGNOSIS — C34.12 MALIGNANT NEOPLASM OF UPPER LOBE OF LEFT LUNG: ICD-10-CM

## 2024-07-23 DIAGNOSIS — R07.9 CHEST PAIN: ICD-10-CM

## 2024-07-23 DIAGNOSIS — I48.92 ATRIAL FLUTTER: ICD-10-CM

## 2024-07-23 DIAGNOSIS — C34.10: Primary | ICD-10-CM

## 2024-07-23 LAB
ABO + RH BLD: NORMAL
ALBUMIN SERPL BCP-MCNC: 3.9 G/DL (ref 3.5–5.2)
ALP SERPL-CCNC: 60 U/L (ref 55–135)
ALT SERPL W/O P-5'-P-CCNC: 27 U/L (ref 10–44)
ANION GAP SERPL CALC-SCNC: 8 MMOL/L (ref 8–16)
ANION GAP SERPL CALC-SCNC: 9 MMOL/L (ref 8–16)
APTT PPP: 26.5 SEC (ref 21–32)
AST SERPL-CCNC: 22 U/L (ref 10–40)
BASOPHILS # BLD AUTO: 0 K/UL (ref 0–0.2)
BASOPHILS NFR BLD: 0 % (ref 0–1.9)
BILIRUB SERPL-MCNC: 0.6 MG/DL (ref 0.1–1)
BLD GP AB SCN CELLS X3 SERPL QL: NORMAL
BUN SERPL-MCNC: 16 MG/DL (ref 8–23)
BUN SERPL-MCNC: 19 MG/DL (ref 8–23)
CALCIUM SERPL-MCNC: 8.6 MG/DL (ref 8.7–10.5)
CALCIUM SERPL-MCNC: 8.6 MG/DL (ref 8.7–10.5)
CHLORIDE SERPL-SCNC: 108 MMOL/L (ref 95–110)
CHLORIDE SERPL-SCNC: 109 MMOL/L (ref 95–110)
CO2 SERPL-SCNC: 21 MMOL/L (ref 23–29)
CO2 SERPL-SCNC: 22 MMOL/L (ref 23–29)
CREAT SERPL-MCNC: 0.7 MG/DL (ref 0.5–1.4)
CREAT SERPL-MCNC: 0.8 MG/DL (ref 0.5–1.4)
DIFFERENTIAL METHOD BLD: ABNORMAL
EOSINOPHIL # BLD AUTO: 0.1 K/UL (ref 0–0.5)
EOSINOPHIL NFR BLD: 2.2 % (ref 0–8)
ERYTHROCYTE [DISTWIDTH] IN BLOOD BY AUTOMATED COUNT: 13.5 % (ref 11.5–14.5)
EST. GFR  (NO RACE VARIABLE): >60 ML/MIN/1.73 M^2
EST. GFR  (NO RACE VARIABLE): >60 ML/MIN/1.73 M^2
GLUCOSE SERPL-MCNC: 133 MG/DL (ref 70–110)
GLUCOSE SERPL-MCNC: 177 MG/DL (ref 70–110)
GLUCOSE SERPL-MCNC: 98 MG/DL (ref 70–110)
HCO3 UR-SCNC: 21.5 MMOL/L (ref 24–28)
HCT VFR BLD AUTO: 36 % (ref 37–48.5)
HCT VFR BLD CALC: 35 %PCV (ref 36–54)
HGB BLD-MCNC: 11.7 G/DL (ref 12–16)
IMM GRANULOCYTES # BLD AUTO: 0.01 K/UL (ref 0–0.04)
IMM GRANULOCYTES NFR BLD AUTO: 0.2 % (ref 0–0.5)
INR PPP: 1 (ref 0.8–1.2)
LYMPHOCYTES # BLD AUTO: 1 K/UL (ref 1–4.8)
LYMPHOCYTES NFR BLD: 22.3 % (ref 18–48)
MCH RBC QN AUTO: 29.3 PG (ref 27–31)
MCHC RBC AUTO-ENTMCNC: 32.5 G/DL (ref 32–36)
MCV RBC AUTO: 90 FL (ref 82–98)
MONOCYTES # BLD AUTO: 0.6 K/UL (ref 0.3–1)
MONOCYTES NFR BLD: 13.2 % (ref 4–15)
NEUTROPHILS # BLD AUTO: 2.9 K/UL (ref 1.8–7.7)
NEUTROPHILS NFR BLD: 62.1 % (ref 38–73)
NRBC BLD-RTO: 0 /100 WBC
OHS QRS DURATION: 84 MS
OHS QTC CALCULATION: 508 MS
PCO2 BLDA: 44 MMHG (ref 35–45)
PH SMN: 7.3 [PH] (ref 7.35–7.45)
PLATELET # BLD AUTO: 127 K/UL (ref 150–450)
PMV BLD AUTO: 10.8 FL (ref 9.2–12.9)
PO2 BLDA: 85 MMHG (ref 80–100)
POC BE: -5 MMOL/L
POC IONIZED CALCIUM: 1.19 MMOL/L (ref 1.06–1.42)
POC SATURATED O2: 95 % (ref 95–100)
POC TCO2: 23 MMOL/L (ref 23–27)
POCT GLUCOSE: 138 MG/DL (ref 70–110)
POTASSIUM BLD-SCNC: 4.2 MMOL/L (ref 3.5–5.1)
POTASSIUM SERPL-SCNC: 3.8 MMOL/L (ref 3.5–5.1)
POTASSIUM SERPL-SCNC: 4 MMOL/L (ref 3.5–5.1)
PROT SERPL-MCNC: 7 G/DL (ref 6–8.4)
PROTHROMBIN TIME: 11.4 SEC (ref 9–12.5)
RBC # BLD AUTO: 3.99 M/UL (ref 4–5.4)
SAMPLE: ABNORMAL
SODIUM BLD-SCNC: 139 MMOL/L (ref 136–145)
SODIUM SERPL-SCNC: 137 MMOL/L (ref 136–145)
SODIUM SERPL-SCNC: 140 MMOL/L (ref 136–145)
SPECIMEN OUTDATE: NORMAL
TROPONIN I SERPL DL<=0.01 NG/ML-MCNC: <0.006 NG/ML (ref 0–0.03)
WBC # BLD AUTO: 4.61 K/UL (ref 3.9–12.7)

## 2024-07-23 PROCEDURE — 85610 PROTHROMBIN TIME: CPT | Performed by: PHYSICIAN ASSISTANT

## 2024-07-23 PROCEDURE — 80053 COMPREHEN METABOLIC PANEL: CPT | Performed by: PHYSICIAN ASSISTANT

## 2024-07-23 PROCEDURE — 88307 TISSUE EXAM BY PATHOLOGIST: CPT | Performed by: PATHOLOGY

## 2024-07-23 PROCEDURE — 37000008 HC ANESTHESIA 1ST 15 MINUTES: Performed by: THORACIC SURGERY (CARDIOTHORACIC VASCULAR SURGERY)

## 2024-07-23 PROCEDURE — 25000242 PHARM REV CODE 250 ALT 637 W/ HCPCS: Performed by: PHYSICIAN ASSISTANT

## 2024-07-23 PROCEDURE — 93005 ELECTROCARDIOGRAM TRACING: CPT

## 2024-07-23 PROCEDURE — 71000015 HC POSTOP RECOV 1ST HR: Performed by: THORACIC SURGERY (CARDIOTHORACIC VASCULAR SURGERY)

## 2024-07-23 PROCEDURE — 63600175 PHARM REV CODE 636 W HCPCS

## 2024-07-23 PROCEDURE — 85025 COMPLETE CBC W/AUTO DIFF WBC: CPT | Performed by: PHYSICIAN ASSISTANT

## 2024-07-23 PROCEDURE — 07B74ZX EXCISION OF THORAX LYMPHATIC, PERCUTANEOUS ENDOSCOPIC APPROACH, DIAGNOSTIC: ICD-10-PCS | Performed by: THORACIC SURGERY (CARDIOTHORACIC VASCULAR SURGERY)

## 2024-07-23 PROCEDURE — 0BNL4ZZ RELEASE LEFT LUNG, PERCUTANEOUS ENDOSCOPIC APPROACH: ICD-10-PCS | Performed by: THORACIC SURGERY (CARDIOTHORACIC VASCULAR SURGERY)

## 2024-07-23 PROCEDURE — 63600175 PHARM REV CODE 636 W HCPCS: Performed by: PHYSICIAN ASSISTANT

## 2024-07-23 PROCEDURE — 93010 ELECTROCARDIOGRAM REPORT: CPT | Mod: ,,, | Performed by: INTERNAL MEDICINE

## 2024-07-23 PROCEDURE — 86850 RBC ANTIBODY SCREEN: CPT

## 2024-07-23 PROCEDURE — 63600175 PHARM REV CODE 636 W HCPCS: Performed by: THORACIC SURGERY (CARDIOTHORACIC VASCULAR SURGERY)

## 2024-07-23 PROCEDURE — 36620 INSERTION CATHETER ARTERY: CPT | Mod: 59,,, | Performed by: ANESTHESIOLOGY

## 2024-07-23 PROCEDURE — 99900035 HC TECH TIME PER 15 MIN (STAT)

## 2024-07-23 PROCEDURE — 36000713 HC OR TIME LEV V EA ADD 15 MIN: Performed by: THORACIC SURGERY (CARDIOTHORACIC VASCULAR SURGERY)

## 2024-07-23 PROCEDURE — 80048 BASIC METABOLIC PNL TOTAL CA: CPT | Mod: XB | Performed by: PHYSICIAN ASSISTANT

## 2024-07-23 PROCEDURE — 84484 ASSAY OF TROPONIN QUANT: CPT

## 2024-07-23 PROCEDURE — 85730 THROMBOPLASTIN TIME PARTIAL: CPT | Performed by: PHYSICIAN ASSISTANT

## 2024-07-23 PROCEDURE — 25000003 PHARM REV CODE 250

## 2024-07-23 PROCEDURE — 27201423 OPTIME MED/SURG SUP & DEVICES STERILE SUPPLY: Performed by: THORACIC SURGERY (CARDIOTHORACIC VASCULAR SURGERY)

## 2024-07-23 PROCEDURE — 27000221 HC OXYGEN, UP TO 24 HOURS

## 2024-07-23 PROCEDURE — 94640 AIRWAY INHALATION TREATMENT: CPT

## 2024-07-23 PROCEDURE — 88305 TISSUE EXAM BY PATHOLOGIST: CPT | Mod: 59 | Performed by: PATHOLOGY

## 2024-07-23 PROCEDURE — 85002 BLEEDING TIME TEST: CPT

## 2024-07-23 PROCEDURE — 37000009 HC ANESTHESIA EA ADD 15 MINS: Performed by: THORACIC SURGERY (CARDIOTHORACIC VASCULAR SURGERY)

## 2024-07-23 PROCEDURE — 36000712 HC OR TIME LEV V 1ST 15 MIN: Performed by: THORACIC SURGERY (CARDIOTHORACIC VASCULAR SURGERY)

## 2024-07-23 PROCEDURE — 88307 TISSUE EXAM BY PATHOLOGIST: CPT | Mod: 26,,, | Performed by: PATHOLOGY

## 2024-07-23 PROCEDURE — A4216 STERILE WATER/SALINE, 10 ML: HCPCS | Performed by: THORACIC SURGERY (CARDIOTHORACIC VASCULAR SURGERY)

## 2024-07-23 PROCEDURE — C1729 CATH, DRAINAGE: HCPCS | Performed by: THORACIC SURGERY (CARDIOTHORACIC VASCULAR SURGERY)

## 2024-07-23 PROCEDURE — C9290 INJ, BUPIVACAINE LIPOSOME: HCPCS | Performed by: THORACIC SURGERY (CARDIOTHORACIC VASCULAR SURGERY)

## 2024-07-23 PROCEDURE — 71000033 HC RECOVERY, INTIAL HOUR: Performed by: THORACIC SURGERY (CARDIOTHORACIC VASCULAR SURGERY)

## 2024-07-23 PROCEDURE — 36415 COLL VENOUS BLD VENIPUNCTURE: CPT

## 2024-07-23 PROCEDURE — 94799 UNLISTED PULMONARY SVC/PX: CPT

## 2024-07-23 PROCEDURE — 25000242 PHARM REV CODE 250 ALT 637 W/ HCPCS

## 2024-07-23 PROCEDURE — 94761 N-INVAS EAR/PLS OXIMETRY MLT: CPT

## 2024-07-23 PROCEDURE — 25000003 PHARM REV CODE 250: Performed by: THORACIC SURGERY (CARDIOTHORACIC VASCULAR SURGERY)

## 2024-07-23 PROCEDURE — 0BBG4ZZ EXCISION OF LEFT UPPER LUNG LOBE, PERCUTANEOUS ENDOSCOPIC APPROACH: ICD-10-PCS | Performed by: THORACIC SURGERY (CARDIOTHORACIC VASCULAR SURGERY)

## 2024-07-23 PROCEDURE — 8E0W4CZ ROBOTIC ASSISTED PROCEDURE OF TRUNK REGION, PERCUTANEOUS ENDOSCOPIC APPROACH: ICD-10-PCS | Performed by: THORACIC SURGERY (CARDIOTHORACIC VASCULAR SURGERY)

## 2024-07-23 PROCEDURE — 20600001 HC STEP DOWN PRIVATE ROOM

## 2024-07-23 PROCEDURE — 71000016 HC POSTOP RECOV ADDL HR: Performed by: THORACIC SURGERY (CARDIOTHORACIC VASCULAR SURGERY)

## 2024-07-23 RX ORDER — BISACODYL 10 MG/1
10 SUPPOSITORY RECTAL DAILY PRN
Status: DISCONTINUED | OUTPATIENT
Start: 2024-07-23 | End: 2024-07-25 | Stop reason: HOSPADM

## 2024-07-23 RX ORDER — PHENYLEPHRINE HCL IN 0.9% NACL 1 MG/10 ML
SYRINGE (ML) INTRAVENOUS
Status: DISCONTINUED | OUTPATIENT
Start: 2024-07-23 | End: 2024-07-23

## 2024-07-23 RX ORDER — AMOXICILLIN 250 MG
1 CAPSULE ORAL 2 TIMES DAILY
Status: DISCONTINUED | OUTPATIENT
Start: 2024-07-23 | End: 2024-07-25 | Stop reason: HOSPADM

## 2024-07-23 RX ORDER — CEFAZOLIN SODIUM 1 G/3ML
INJECTION, POWDER, FOR SOLUTION INTRAMUSCULAR; INTRAVENOUS
Status: DISCONTINUED | OUTPATIENT
Start: 2024-07-23 | End: 2024-07-23

## 2024-07-23 RX ORDER — METOPROLOL TARTRATE 25 MG/1
12.5 TABLET ORAL 2 TIMES DAILY
Status: DISCONTINUED | OUTPATIENT
Start: 2024-07-23 | End: 2024-07-25 | Stop reason: HOSPADM

## 2024-07-23 RX ORDER — HYDROMORPHONE HYDROCHLORIDE 1 MG/ML
0.5 INJECTION, SOLUTION INTRAMUSCULAR; INTRAVENOUS; SUBCUTANEOUS EVERY 6 HOURS PRN
Status: DISCONTINUED | OUTPATIENT
Start: 2024-07-23 | End: 2024-07-25 | Stop reason: HOSPADM

## 2024-07-23 RX ORDER — HALOPERIDOL 5 MG/ML
0.5 INJECTION INTRAMUSCULAR EVERY 10 MIN PRN
Status: DISCONTINUED | OUTPATIENT
Start: 2024-07-23 | End: 2024-07-23 | Stop reason: HOSPADM

## 2024-07-23 RX ORDER — KETAMINE HCL IN 0.9 % NACL 50 MG/5 ML
SYRINGE (ML) INTRAVENOUS
Status: DISCONTINUED | OUTPATIENT
Start: 2024-07-23 | End: 2024-07-23

## 2024-07-23 RX ORDER — HYDROMORPHONE HYDROCHLORIDE 1 MG/ML
0.2 INJECTION, SOLUTION INTRAMUSCULAR; INTRAVENOUS; SUBCUTANEOUS EVERY 5 MIN PRN
Status: DISCONTINUED | OUTPATIENT
Start: 2024-07-23 | End: 2024-07-23 | Stop reason: HOSPADM

## 2024-07-23 RX ORDER — IPRATROPIUM BROMIDE AND ALBUTEROL SULFATE 2.5; .5 MG/3ML; MG/3ML
3 SOLUTION RESPIRATORY (INHALATION)
Status: DISCONTINUED | OUTPATIENT
Start: 2024-07-23 | End: 2024-07-23

## 2024-07-23 RX ORDER — SODIUM CHLORIDE 9 MG/ML
INJECTION, SOLUTION INTRAMUSCULAR; INTRAVENOUS; SUBCUTANEOUS
Status: DISCONTINUED | OUTPATIENT
Start: 2024-07-23 | End: 2024-07-23 | Stop reason: HOSPADM

## 2024-07-23 RX ORDER — BUPIVACAINE HYDROCHLORIDE 2.5 MG/ML
INJECTION, SOLUTION EPIDURAL; INFILTRATION; INTRACAUDAL
Status: DISCONTINUED | OUTPATIENT
Start: 2024-07-23 | End: 2024-07-23 | Stop reason: HOSPADM

## 2024-07-23 RX ORDER — SERTRALINE HYDROCHLORIDE 100 MG/1
100 TABLET, FILM COATED ORAL NIGHTLY
Status: DISCONTINUED | OUTPATIENT
Start: 2024-07-23 | End: 2024-07-25 | Stop reason: HOSPADM

## 2024-07-23 RX ORDER — TRAZODONE HYDROCHLORIDE 50 MG/1
50 TABLET ORAL NIGHTLY PRN
Status: DISCONTINUED | OUTPATIENT
Start: 2024-07-23 | End: 2024-07-25 | Stop reason: HOSPADM

## 2024-07-23 RX ORDER — ASPIRIN 81 MG/1
81 TABLET ORAL DAILY
Status: DISCONTINUED | OUTPATIENT
Start: 2024-07-24 | End: 2024-07-25 | Stop reason: HOSPADM

## 2024-07-23 RX ORDER — LIDOCAINE HYDROCHLORIDE 20 MG/ML
INJECTION, SOLUTION EPIDURAL; INFILTRATION; INTRACAUDAL; PERINEURAL
Status: DISCONTINUED | OUTPATIENT
Start: 2024-07-23 | End: 2024-07-23

## 2024-07-23 RX ORDER — MIDAZOLAM HYDROCHLORIDE 1 MG/ML
INJECTION INTRAMUSCULAR; INTRAVENOUS
Status: DISCONTINUED | OUTPATIENT
Start: 2024-07-23 | End: 2024-07-23

## 2024-07-23 RX ORDER — OXYCODONE HYDROCHLORIDE 10 MG/1
10 TABLET ORAL EVERY 4 HOURS PRN
Status: DISCONTINUED | OUTPATIENT
Start: 2024-07-23 | End: 2024-07-25 | Stop reason: HOSPADM

## 2024-07-23 RX ORDER — GABAPENTIN 300 MG/1
300 CAPSULE ORAL NIGHTLY
Status: DISCONTINUED | OUTPATIENT
Start: 2024-07-23 | End: 2024-07-25 | Stop reason: HOSPADM

## 2024-07-23 RX ORDER — ACETAMINOPHEN 500 MG
1000 TABLET ORAL ONCE
Status: COMPLETED | OUTPATIENT
Start: 2024-07-23 | End: 2024-07-23

## 2024-07-23 RX ORDER — DEXAMETHASONE SODIUM PHOSPHATE 4 MG/ML
INJECTION, SOLUTION INTRA-ARTICULAR; INTRALESIONAL; INTRAMUSCULAR; INTRAVENOUS; SOFT TISSUE
Status: DISCONTINUED | OUTPATIENT
Start: 2024-07-23 | End: 2024-07-23

## 2024-07-23 RX ORDER — METOPROLOL TARTRATE 1 MG/ML
INJECTION, SOLUTION INTRAVENOUS
Status: DISCONTINUED | OUTPATIENT
Start: 2024-07-23 | End: 2024-07-23

## 2024-07-23 RX ORDER — PROPOFOL 10 MG/ML
VIAL (ML) INTRAVENOUS
Status: DISCONTINUED | OUTPATIENT
Start: 2024-07-23 | End: 2024-07-23

## 2024-07-23 RX ORDER — LIDOCAINE HYDROCHLORIDE 10 MG/ML
1 INJECTION, SOLUTION EPIDURAL; INFILTRATION; INTRACAUDAL; PERINEURAL ONCE
Status: DISCONTINUED | OUTPATIENT
Start: 2024-07-23 | End: 2024-07-23

## 2024-07-23 RX ORDER — FENTANYL CITRATE 50 UG/ML
INJECTION, SOLUTION INTRAMUSCULAR; INTRAVENOUS
Status: DISCONTINUED | OUTPATIENT
Start: 2024-07-23 | End: 2024-07-23

## 2024-07-23 RX ORDER — GLUCAGON 1 MG
1 KIT INJECTION
Status: DISCONTINUED | OUTPATIENT
Start: 2024-07-23 | End: 2024-07-23 | Stop reason: HOSPADM

## 2024-07-23 RX ORDER — ONDANSETRON 8 MG/1
8 TABLET, ORALLY DISINTEGRATING ORAL EVERY 8 HOURS PRN
Status: DISCONTINUED | OUTPATIENT
Start: 2024-07-23 | End: 2024-07-25

## 2024-07-23 RX ORDER — ACETAMINOPHEN 500 MG
1000 TABLET ORAL EVERY 8 HOURS
Status: DISCONTINUED | OUTPATIENT
Start: 2024-07-23 | End: 2024-07-25 | Stop reason: HOSPADM

## 2024-07-23 RX ORDER — ROCURONIUM BROMIDE 10 MG/ML
INJECTION, SOLUTION INTRAVENOUS
Status: DISCONTINUED | OUTPATIENT
Start: 2024-07-23 | End: 2024-07-23

## 2024-07-23 RX ORDER — MAGNESIUM SULFATE HEPTAHYDRATE 40 MG/ML
2 INJECTION, SOLUTION INTRAVENOUS ONCE
Status: COMPLETED | OUTPATIENT
Start: 2024-07-23 | End: 2024-07-23

## 2024-07-23 RX ORDER — POLYETHYLENE GLYCOL 3350 17 G/17G
17 POWDER, FOR SOLUTION ORAL DAILY
Status: DISCONTINUED | OUTPATIENT
Start: 2024-07-23 | End: 2024-07-25 | Stop reason: HOSPADM

## 2024-07-23 RX ORDER — SODIUM CHLORIDE 0.9 % (FLUSH) 0.9 %
10 SYRINGE (ML) INJECTION
Status: DISCONTINUED | OUTPATIENT
Start: 2024-07-23 | End: 2024-07-23 | Stop reason: HOSPADM

## 2024-07-23 RX ORDER — METHOCARBAMOL 500 MG/1
500 TABLET, FILM COATED ORAL 4 TIMES DAILY
Status: DISCONTINUED | OUTPATIENT
Start: 2024-07-23 | End: 2024-07-25 | Stop reason: HOSPADM

## 2024-07-23 RX ORDER — ONDANSETRON HYDROCHLORIDE 2 MG/ML
INJECTION, SOLUTION INTRAVENOUS
Status: DISCONTINUED | OUTPATIENT
Start: 2024-07-23 | End: 2024-07-23

## 2024-07-23 RX ORDER — LEVALBUTEROL 1.25 MG/.5ML
1.25 SOLUTION, CONCENTRATE RESPIRATORY (INHALATION) EVERY 12 HOURS
Status: DISCONTINUED | OUTPATIENT
Start: 2024-07-23 | End: 2024-07-25 | Stop reason: HOSPADM

## 2024-07-23 RX ORDER — ENOXAPARIN SODIUM 100 MG/ML
40 INJECTION SUBCUTANEOUS EVERY 24 HOURS
Status: DISCONTINUED | OUTPATIENT
Start: 2024-07-24 | End: 2024-07-25 | Stop reason: HOSPADM

## 2024-07-23 RX ORDER — METOPROLOL TARTRATE 1 MG/ML
5 INJECTION, SOLUTION INTRAVENOUS EVERY 5 MIN PRN
Status: DISCONTINUED | OUTPATIENT
Start: 2024-07-23 | End: 2024-07-25 | Stop reason: HOSPADM

## 2024-07-23 RX ORDER — OXYCODONE HYDROCHLORIDE 5 MG/1
5 TABLET ORAL EVERY 4 HOURS PRN
Status: DISCONTINUED | OUTPATIENT
Start: 2024-07-23 | End: 2024-07-25 | Stop reason: HOSPADM

## 2024-07-23 RX ADMIN — SUGAMMADEX 200 MG: 100 INJECTION, SOLUTION INTRAVENOUS at 11:07

## 2024-07-23 RX ADMIN — OXYCODONE 5 MG: 5 TABLET ORAL at 10:07

## 2024-07-23 RX ADMIN — METOROPROLOL TARTRATE 2 MG: 5 INJECTION, SOLUTION INTRAVENOUS at 07:07

## 2024-07-23 RX ADMIN — HYDROMORPHONE HYDROCHLORIDE 0.2 MG: 1 INJECTION, SOLUTION INTRAMUSCULAR; INTRAVENOUS; SUBCUTANEOUS at 02:07

## 2024-07-23 RX ADMIN — ACETAMINOPHEN 1000 MG: 500 TABLET ORAL at 06:07

## 2024-07-23 RX ADMIN — PROPOFOL 50 MG: 10 INJECTION, EMULSION INTRAVENOUS at 07:07

## 2024-07-23 RX ADMIN — ACETAMINOPHEN 1000 MG: 500 TABLET ORAL at 09:07

## 2024-07-23 RX ADMIN — FENTANYL CITRATE 25 MCG: 50 INJECTION INTRAMUSCULAR; INTRAVENOUS at 11:07

## 2024-07-23 RX ADMIN — OXYCODONE HYDROCHLORIDE 10 MG: 10 TABLET ORAL at 02:07

## 2024-07-23 RX ADMIN — AMIODARONE HYDROCHLORIDE 150 MG: 1.5 INJECTION, SOLUTION INTRAVENOUS at 05:07

## 2024-07-23 RX ADMIN — SODIUM CHLORIDE, SODIUM GLUCONATE, SODIUM ACETATE, POTASSIUM CHLORIDE, MAGNESIUM CHLORIDE, SODIUM PHOSPHATE, DIBASIC, AND POTASSIUM PHOSPHATE: .53; .5; .37; .037; .03; .012; .00082 INJECTION, SOLUTION INTRAVENOUS at 07:07

## 2024-07-23 RX ADMIN — CEFAZOLIN 2 G: 330 INJECTION, POWDER, FOR SOLUTION INTRAMUSCULAR; INTRAVENOUS at 08:07

## 2024-07-23 RX ADMIN — FENTANYL CITRATE 100 MCG: 50 INJECTION INTRAMUSCULAR; INTRAVENOUS at 07:07

## 2024-07-23 RX ADMIN — LIDOCAINE HYDROCHLORIDE 100 MG: 20 INJECTION, SOLUTION EPIDURAL; INFILTRATION; INTRACAUDAL at 07:07

## 2024-07-23 RX ADMIN — GABAPENTIN 400 MG: 300 CAPSULE ORAL at 06:07

## 2024-07-23 RX ADMIN — SENNOSIDES AND DOCUSATE SODIUM 1 TABLET: 50; 8.6 TABLET ORAL at 09:07

## 2024-07-23 RX ADMIN — Medication 50 MCG: at 08:07

## 2024-07-23 RX ADMIN — SODIUM CHLORIDE: 9 INJECTION, SOLUTION INTRAVENOUS at 07:07

## 2024-07-23 RX ADMIN — METOPROLOL TARTRATE 12.5 MG: 25 TABLET, FILM COATED ORAL at 09:07

## 2024-07-23 RX ADMIN — MAGNESIUM SULFATE HEPTAHYDRATE 2 G: 40 INJECTION, SOLUTION INTRAVENOUS at 04:07

## 2024-07-23 RX ADMIN — CEFAZOLIN 2 G: 2 INJECTION, POWDER, FOR SOLUTION INTRAMUSCULAR; INTRAVENOUS at 07:07

## 2024-07-23 RX ADMIN — AMIODARONE HYDROCHLORIDE 1 MG/MIN: 1.8 INJECTION, SOLUTION INTRAVENOUS at 05:07

## 2024-07-23 RX ADMIN — MIDAZOLAM 2 MG: 1 INJECTION INTRAMUSCULAR; INTRAVENOUS at 06:07

## 2024-07-23 RX ADMIN — AMIODARONE HYDROCHLORIDE 0.5 MG/MIN: 1.8 INJECTION, SOLUTION INTRAVENOUS at 10:07

## 2024-07-23 RX ADMIN — POLYETHYLENE GLYCOL 3350 17 G: 17 POWDER, FOR SOLUTION ORAL at 12:07

## 2024-07-23 RX ADMIN — METOROPROLOL TARTRATE 5 MG: 5 INJECTION, SOLUTION INTRAVENOUS at 12:07

## 2024-07-23 RX ADMIN — ROCURONIUM BROMIDE 20 MG: 10 INJECTION INTRAVENOUS at 09:07

## 2024-07-23 RX ADMIN — ONDANSETRON 4 MG: 2 INJECTION INTRAMUSCULAR; INTRAVENOUS at 11:07

## 2024-07-23 RX ADMIN — ROCURONIUM BROMIDE 100 MG: 10 INJECTION INTRAVENOUS at 07:07

## 2024-07-23 RX ADMIN — GABAPENTIN 300 MG: 300 CAPSULE ORAL at 09:07

## 2024-07-23 RX ADMIN — DEXAMETHASONE SODIUM PHOSPHATE 4 MG: 4 INJECTION INTRA-ARTICULAR; INTRALESIONAL; INTRAMUSCULAR; INTRAVENOUS; SOFT TISSUE at 07:07

## 2024-07-23 RX ADMIN — PROPOFOL 20 MG: 10 INJECTION, EMULSION INTRAVENOUS at 11:07

## 2024-07-23 RX ADMIN — ROCURONIUM BROMIDE 10 MG: 10 INJECTION INTRAVENOUS at 10:07

## 2024-07-23 RX ADMIN — METHOCARBAMOL 500 MG: 500 TABLET ORAL at 05:07

## 2024-07-23 RX ADMIN — SERTRALINE 100 MG: 100 TABLET, FILM COATED ORAL at 09:07

## 2024-07-23 RX ADMIN — ROCURONIUM BROMIDE 20 MG: 10 INJECTION INTRAVENOUS at 08:07

## 2024-07-23 RX ADMIN — Medication 15 MG: at 08:07

## 2024-07-23 RX ADMIN — METHOCARBAMOL 500 MG: 500 TABLET ORAL at 12:07

## 2024-07-23 RX ADMIN — HYDROMORPHONE HYDROCHLORIDE 0.2 MG: 1 INJECTION, SOLUTION INTRAMUSCULAR; INTRAVENOUS; SUBCUTANEOUS at 12:07

## 2024-07-23 RX ADMIN — IPRATROPIUM BROMIDE AND ALBUTEROL SULFATE 3 ML: 2.5; .5 SOLUTION RESPIRATORY (INHALATION) at 12:07

## 2024-07-23 RX ADMIN — METOROPROLOL TARTRATE 5 MG: 5 INJECTION, SOLUTION INTRAVENOUS at 08:07

## 2024-07-23 RX ADMIN — LEVALBUTEROL 1.25 MG: 1.25 SOLUTION, CONCENTRATE RESPIRATORY (INHALATION) at 07:07

## 2024-07-23 RX ADMIN — ACETAMINOPHEN 1000 MG: 500 TABLET ORAL at 02:07

## 2024-07-23 RX ADMIN — METHOCARBAMOL 500 MG: 500 TABLET ORAL at 09:07

## 2024-07-23 RX ADMIN — SENNOSIDES AND DOCUSATE SODIUM 1 TABLET: 50; 8.6 TABLET ORAL at 12:07

## 2024-07-23 RX ADMIN — PROPOFOL 200 MG: 10 INJECTION, EMULSION INTRAVENOUS at 07:07

## 2024-07-23 RX ADMIN — Medication 50 MCG: at 09:07

## 2024-07-23 NOTE — OP NOTE
Date of Surgery: 7/23/24  Preoperative Diagnosis:  Left upper lobe adenocarcinoma, history of ischemic cardiomyopathy  Postoperative Diagnosis: Same  Procedure:  Robotic lysis of adhesions, nonanatomic left upper lobe apical wedge resection, mediastinal lymph node dissection, intercostal nerve block 2 or more levels  Surgeon: Rubin Sharma MD  First Assistant: Bryan Arnold MD  Anesthesia: GETA, 7 level intercostal Exparel/Marcaine/saline  EBL:  100mL    Surgery in Detail:    The patient has a diagnosis of left upper lobe adenocarcinoma.  She also has a history of ischemic cardiomyopathy with some recovery.  Resection is indicated.      The patient was taken to the operating room placed supine and identified.  General anesthesia was established with double-lumen tube placement.  Tube positioning was confirmed fiberoptically.  The patient was positioned in a right lateral decubitus position, all pressure points were padded, and the left lung was isolated.  The left chest was prepped and draped and a robotic time-out was performed.  A combination of 8 and 12 mm robotic ports were placed in the 8th interspace between the anterior axillary line and the paravertebral gutter.  A 12 mm assistant port was placed in the 10th interspace posterior axillary line.  Carbon dioxide was insufflated and a 7 level intercostal nerve block was performed.  The robot was docked.  The inferior pulmonary ligament was divided and a left level 9 lymph node harvested.  There appeared to be a diffuse inflammatory reaction with contraction of the left hilum below the descending thoracic aorta.  The hilum was accessed by aggressively retracting the lung anteriorly.  There were moderate amount of posterior adhesions which made dissection difficult.  The posterior hilar pleura was incised and the level 7 jatinder packet harvested.  There was a considerable amount of neovascularization covering the visceral pleural surface.  The suprahilar  pleura was carefully incised as this area was also covered with neovascularization.  The aortopulmonary window was explored and the level 5 jatinder packet harvested.  A left anterior level 10 node was harvested.  The fissure was practically absent with fusion of the left upper and lower lobes along the entire extent.  I engaged in a very tedious lysis of adhesions in an effort to create a dissection plane.  There was neovascularization crossing what appeared to be the fissure throughout its entire extent.  I could not create reasonable plane despite spending 60 minutes engaged in tedious dissection.  It was quite apparent that the patient had some prior infectious/inflammatory process within the left hemithorax.  There was a considerable amount of friability and diffuse oozing throughout the patient's chest.  All of the robotic port sites bled, requiring robotic undocking and cauterization..  I did however identify the mass within the apical posterior aspect of the left upper lobe.  I engaged in a very tedious dissection to create a slight degree of separation that allowed for an extended wedge resection.  During the course of creating the separation plane between the upper and lower lobes, each time the lung was manipulated with blunt robotic instruments, the parenchyma bled.  I performed a wedge resection using several fires of the robotic green and black loads as well as a linear Endo stapler due to the tissue thickness.  The specimen was placed into a large endobag and extracted through an enlarged anterior robotic port.  Inspection on the back table indicated grossly negative margins.  Hemostatic gauze was placed along the mediastinal dissection bed and all port sites were carefully examined and hemostasis assured after removing robotic instrumentation.  A 28 Urdu straight chest tube was inserted and secured with silk suture.  Left lung ventilation was restored with complete lung expansion.  All port sites were  closed in multiple layers using absorbable suture.  A sterile dressing was applied.  The patient was extubated and transported to the PACU in stable condition.      Bedside assistant attestation:  Shiela Thompson PA-C functioned as a bedside 1st assistant.  Her duties included robotic docking, instrument exchange, and specimen retrieval throughout the entire surgery.  There was no qualified resident available to function as a bedside first assistant given the case complexity and extent of duties described above.     Attending attestation:  I was present for and either directly assisted with or performed the critical and key portions of the procedure.    Thoracic (Pulmonary) Cancer - Synoptic Operative Report Summary    Side of surgery Left   Surgical approach Robotic   Tumor type NSCLC - Adenocarcinoma   Which lymph nodes were submitted as separate specimens? 5 - Subaortic, 7 - Subcarinal, 9L - Pulmonary ligament (left), and 10L - Hilar (left)   What pre-operative therapies did the patient receive? None

## 2024-07-23 NOTE — PROGRESS NOTES
Amesthesia Catrina SCANLON came to bedside to assess pt. MD okay with pt's EKGs. MD aware of IV Amiodarone initiation. No other orders given. Surgery team paged. CTM pt.

## 2024-07-23 NOTE — PROGRESS NOTES
Clayton SCANLON came to bedside. Stated okay with pt's EKG results and okay with amiodarone drip. Waiting for medicine from pharmacy. Pt still asymptomatic, VSS. No other orders given. WCTM,

## 2024-07-23 NOTE — PROGRESS NOTES
Clayton SCANLON at bedside at time of pt's arrival when pt's HR elevated to 120s-130s a-flutter. MD stated to give IV metoprolol if HR sustains above 120 and notify team if BP goes down. HR fluctuating between 70s-120s at this time. Pt asymptomatic, other VS stable. No other orders given. WCTM.

## 2024-07-23 NOTE — PROGRESS NOTES
Anesthesia float MD notified of pt's sudden L chest discomfort before beginning Amio infusion. Complaining of squeezing and heaviness that is constant. No SOB, VSS, HR in low 100s. Pt slightly sweaty since she's been in recovery, temp normal, no chills. No other complaints. Another EKG ordered. MD stated she will come to bedside.

## 2024-07-23 NOTE — TRANSFER OF CARE
"Anesthesia Transfer of Care Note    Patient: Keri Baca    Procedure(s) Performed: Procedure(s) (LRB):  LYMPHADENECTOMY (Left)  BLOCK, NERVE, INTERCOSTAL, 2 OR MORE (Left)  LYSIS, ADHESIONS (Left)  XI ROBOTIC WEDGE RESECTION, LUNG (RATS) (Left)    Patient location: PACU    Anesthesia Type: general    Transport from OR: Transported from OR on 6-10 L/min O2 by face mask with adequate spontaneous ventilation    Post pain: adequate analgesia    Post assessment: no apparent anesthetic complications    Post vital signs: stable    Level of consciousness: awake and alert    Nausea/Vomiting: no nausea/vomiting    Complications: none    Transfer of care protocol was followed      Last vitals: Visit Vitals  BP (!) 128/71 (BP Location: Left arm, Patient Position: Lying)   Pulse 88   Temp 36.7 °C (98.1 °F) (Temporal)   Resp 18   Ht 5' 3" (1.6 m)   Wt 76.5 kg (168 lb 10.4 oz)   SpO2 97%   Breastfeeding No   BMI 29.88 kg/m²     "

## 2024-07-23 NOTE — BRIEF OP NOTE
[facility] - Surgery ([facility])  Brief Operative Note    SUMMARY     Surgery Date: 7/23/2024     Surgeons and Role:     * Rubin Sharma MD - Primary     * Shiela Thompson PA-C - Assisting     * Bryan Arnold MD - Resident - Assisting        Pre-op Diagnosis:  Malignant neoplasm of upper lobe of left lung [C34.12]    Post-op Diagnosis:  Post-Op Diagnosis Codes:     * Malignant neoplasm of upper lobe of left lung [C34.12]    Procedure(s) (LRB):  LYMPHADENECTOMY (Left)  BLOCK, NERVE, INTERCOSTAL, 2 OR MORE (Left)  LYSIS, ADHESIONS (Left)  XI ROBOTIC WEDGE RESECTION, LUNG (RATS) (Left)    Anesthesia: General    Implants:  * No implants in log *    Operative Findings: Wedge resection of left upper lobe performed. Lobectomy deferred due to coagulopathy and distorted anatomy with a fused fissure filled with neovascularization and crossing vessels. 1 chest tube.    Estimated Blood Loss: 100 mL         Specimens:   Specimen (24h ago, onward)       Start     Ordered    07/23/24 1107  Specimen to Pathology, Surgery Pulmonary and Thoracic  Once        Comments: Pre-op Diagnosis: Malignant neoplasm of upper lobe of left lung [C34.12]Procedure(s):XI ROBOTIC  LOBECTOMY,LUNG LEFT UPPERLYMPHADENECTOMYBLOCK, NERVE, INTERCOSTAL, 2 OR MORE Number of specimens: 5Name of specimens: 1. LEFT LEVEL 9 LYMPHNODE - PERMANENT2. LEVEL 7 LYMPHNODE,  4 NODES PRESENT - PERMANENT3. LEVEL 5 LYMPHNODE- PERMANENT4. LEFT LEVEL 10 LYMPHNODE - PERMANENT5. LEFT APICAL POSTERIOR WEDGE DISSECTION - PERMANENT     References:    Click here for ordering Quick Tip   Question Answer Comment   Procedure Type: Pulmonary and Thoracic    Specimen Class: Known or suspected malignancy    Release to patient Immediate        07/23/24 1121                    [id number]

## 2024-07-23 NOTE — ANESTHESIA PROCEDURE NOTES
Arterial    Diagnosis: HTN    Patient location during procedure: done in OR    Staffing  Authorizing Provider: Abraham Saenz MD  Performing Provider: Galilea Saeed MD    Staffing  Performed by: Galilea Saeed MD  Authorized by: Abraham Saenz MD    Anesthesiologist was present at the time of the procedure.    Preanesthetic Checklist  Completed: patient identified, IV checked, site marked, risks and benefits discussed, surgical consent, monitors and equipment checked, pre-op evaluation, timeout performed and anesthesia consent givenArterial  Skin Prep: chlorhexidine gluconate and isopropyl alcohol  Local Infiltration: none  Orientation: left  Location: radial    Catheter Size: 20 G  Catheter placement by Ultrasound guidance. Heme positive aspiration all ports.   Vessel Caliber: medium, patent, compressibility normal  Needle advanced into vessel with real time Ultrasound guidance.Insertion Attempts: 1  Assessment  Dressing: secured with tape and tegaderm  Patient: Tolerated well

## 2024-07-23 NOTE — ANESTHESIA PROCEDURE NOTES
Intubation    Date/Time: 7/23/2024 7:17 AM    Performed by: Galilea Saeed MD  Authorized by: Abraham Saenz MD    Intubation:     Induction:  Intravenous    Intubated:  Postinduction    Mask Ventilation:  Easy with oral airway    Attempts:  1    Attempted By:  Resident anesthesiologist    Method of Intubation:  Video laryngoscopy    Blade:  Rosa 3    Laryngeal View Grade: Grade I - full view of cords      Difficult Airway Encountered?: No      Complications:  None    Airway Device:  Double lumen tube left    Airway Device Size:  35F    Style/Cuff Inflation:  Cuffed (inflated to minimal occlusive pressure)    Tube secured:  31    Secured at:  The lips    Placement Verified By:  Capnometry    Complicating Factors:  None    Findings Post-Intubation:  BS equal bilateral and atraumatic/condition of teeth unchanged

## 2024-07-24 PROCEDURE — 63600175 PHARM REV CODE 636 W HCPCS

## 2024-07-24 PROCEDURE — 94761 N-INVAS EAR/PLS OXIMETRY MLT: CPT

## 2024-07-24 PROCEDURE — 99900035 HC TECH TIME PER 15 MIN (STAT)

## 2024-07-24 PROCEDURE — 63600175 PHARM REV CODE 636 W HCPCS: Performed by: PHYSICIAN ASSISTANT

## 2024-07-24 PROCEDURE — 20600001 HC STEP DOWN PRIVATE ROOM

## 2024-07-24 PROCEDURE — 25000003 PHARM REV CODE 250

## 2024-07-24 PROCEDURE — 94799 UNLISTED PULMONARY SVC/PX: CPT

## 2024-07-24 PROCEDURE — 94640 AIRWAY INHALATION TREATMENT: CPT

## 2024-07-24 PROCEDURE — 25000242 PHARM REV CODE 250 ALT 637 W/ HCPCS: Performed by: PHYSICIAN ASSISTANT

## 2024-07-24 RX ADMIN — METHOCARBAMOL 500 MG: 500 TABLET ORAL at 01:07

## 2024-07-24 RX ADMIN — ACETAMINOPHEN 1000 MG: 500 TABLET ORAL at 06:07

## 2024-07-24 RX ADMIN — SENNOSIDES AND DOCUSATE SODIUM 1 TABLET: 50; 8.6 TABLET ORAL at 09:07

## 2024-07-24 RX ADMIN — METOPROLOL TARTRATE 12.5 MG: 25 TABLET, FILM COATED ORAL at 09:07

## 2024-07-24 RX ADMIN — METHOCARBAMOL 500 MG: 500 TABLET ORAL at 09:07

## 2024-07-24 RX ADMIN — OXYCODONE HYDROCHLORIDE 10 MG: 10 TABLET ORAL at 02:07

## 2024-07-24 RX ADMIN — POLYETHYLENE GLYCOL 3350 17 G: 17 POWDER, FOR SOLUTION ORAL at 09:07

## 2024-07-24 RX ADMIN — ASPIRIN 81 MG: 81 TABLET, COATED ORAL at 09:07

## 2024-07-24 RX ADMIN — SERTRALINE 100 MG: 100 TABLET, FILM COATED ORAL at 09:07

## 2024-07-24 RX ADMIN — OXYCODONE HYDROCHLORIDE 10 MG: 10 TABLET ORAL at 09:07

## 2024-07-24 RX ADMIN — AMIODARONE HYDROCHLORIDE 0.5 MG/MIN: 1.8 INJECTION, SOLUTION INTRAVENOUS at 11:07

## 2024-07-24 RX ADMIN — HYDROMORPHONE HYDROCHLORIDE 0.5 MG: 1 INJECTION, SOLUTION INTRAMUSCULAR; INTRAVENOUS; SUBCUTANEOUS at 05:07

## 2024-07-24 RX ADMIN — HYDROMORPHONE HYDROCHLORIDE 0.5 MG: 1 INJECTION, SOLUTION INTRAMUSCULAR; INTRAVENOUS; SUBCUTANEOUS at 03:07

## 2024-07-24 RX ADMIN — METHOCARBAMOL 500 MG: 500 TABLET ORAL at 06:07

## 2024-07-24 RX ADMIN — LEVALBUTEROL 1.25 MG: 1.25 SOLUTION, CONCENTRATE RESPIRATORY (INHALATION) at 08:07

## 2024-07-24 RX ADMIN — GABAPENTIN 300 MG: 300 CAPSULE ORAL at 09:07

## 2024-07-24 RX ADMIN — OXYCODONE HYDROCHLORIDE 10 MG: 10 TABLET ORAL at 01:07

## 2024-07-24 RX ADMIN — CEFAZOLIN 2 G: 2 INJECTION, POWDER, FOR SOLUTION INTRAMUSCULAR; INTRAVENOUS at 01:07

## 2024-07-24 RX ADMIN — ENOXAPARIN SODIUM 40 MG: 40 INJECTION SUBCUTANEOUS at 06:07

## 2024-07-24 RX ADMIN — LEVALBUTEROL 1.25 MG: 1.25 SOLUTION, CONCENTRATE RESPIRATORY (INHALATION) at 09:07

## 2024-07-24 RX ADMIN — ACETAMINOPHEN 1000 MG: 500 TABLET ORAL at 09:07

## 2024-07-24 RX ADMIN — ACETAMINOPHEN 1000 MG: 500 TABLET ORAL at 01:07

## 2024-07-24 NOTE — PROGRESS NOTES
Randolph SCANLON came to bedside to assess pt around 1830. Stated to draw troponin and do another chest x-ray. Pt able to rest despite chest discomfort and VSS. Amiodarone initiated previously and pt tolerating fine. No other orders given. Waiting until after shift change for pt to still plan to go to [facility].

## 2024-07-24 NOTE — SUBJECTIVE & OBJECTIVE
Interval History: NAEON. Expected postop incisional pain. AVSS with rate controlled afib on amio gtt. Chest tube with 210 mL of SS output.     Medications:  Continuous Infusions:   amiodarone in dextrose 5%  0.5 mg/min Intravenous Continuous 16.7 mL/hr at 07/23/24 2259 0.5 mg/min at 07/23/24 2259     Scheduled Meds:   acetaminophen  1,000 mg Oral Q8H    aspirin  81 mg Oral Daily    enoxparin  40 mg Subcutaneous Daily    gabapentin  300 mg Oral QHS    levalbuterol  1.25 mg Nebulization Q12H    methocarbamoL  500 mg Oral QID    metoprolol tartrate  12.5 mg Oral BID    polyethylene glycol  17 g Oral Daily    senna-docusate 8.6-50 mg  1 tablet Oral BID    sertraline  100 mg Oral QHS     PRN Meds:  Current Facility-Administered Medications:     bisacodyL, 10 mg, Rectal, Daily PRN    dextrose 10%, 12.5 g, Intravenous, PRN    dextrose 10%, 25 g, Intravenous, PRN    HYDROmorphone, 0.5 mg, Intravenous, Q6H PRN    metoprolol, 5 mg, Intravenous, Q5 Min PRN    ondansetron, 8 mg, Oral, Q8H PRN    oxyCODONE, 5 mg, Oral, Q4H PRN    oxyCODONE, 10 mg, Oral, Q4H PRN    traZODone, 50 mg, Oral, Nightly PRN     Review of patient's allergies indicates:  No Known Allergies  Objective:     Vital Signs (Most Recent):  Temp: 99.8 °F (37.7 °C) (07/24/24 0712)  Pulse: 101 (07/24/24 0816)  Resp: 20 (07/24/24 0816)  BP: 110/75 (07/24/24 0712)  SpO2: (!) 94 % (07/24/24 0816) Vital Signs (24h Range):  Temp:  [97 °F (36.1 °C)-99.8 °F (37.7 °C)] 99.8 °F (37.7 °C)  Pulse:  [] 101  Resp:  [12-29] 20  SpO2:  [87 %-100 %] 94 %  BP: (103-165)/() 110/75     Intake/Output - Last 3 Shifts         07/22 0700  07/23 0659 07/23 0700 07/24 0659 07/24 0700 07/25 0659    P.O.  220     I.V. (mL/kg)  53.8 (0.7)     IV Piggyback  1412     Total Intake(mL/kg)  1685.9 (20.8)     Urine (mL/kg/hr)  800 (0.4)     Chest Tube  210     Total Output  1010     Net  +675.9            Urine Occurrence  1 x 1 x            SpO2: (!) 94 %        Physical Exam  Vitals  reviewed.   Constitutional:       Appearance: Normal appearance.   Cardiovascular:      Rate and Rhythm: Normal rate.      Pulses: Normal pulses.   Pulmonary:      Effort: Pulmonary effort is normal.      Comments: Chest tube to water seal with minimal forced expiratory air leak. Tidaling appropriately.  Abdominal:      Palpations: Abdomen is soft.      Tenderness: There is no abdominal tenderness.   Skin:     General: Skin is warm and dry.   Neurological:      General: No focal deficit present.      Mental Status: She is alert and oriented to person, place, and time.            Significant Labs:  All pertinent labs from the last 24 hours have been reviewed.    Significant Diagnostics:  I have reviewed all pertinent imaging results/findings within the past 24 hours.    VTE Risk Mitigation (From admission, onward)           Ordered     enoxaparin injection 40 mg  Daily         07/23/24 1146     IP VTE HIGH RISK PATIENT  Once         07/23/24 1146     Place ALDEN hose  Until discontinued         07/23/24 1146     Place sequential compression device  Until discontinued         07/23/24 1146

## 2024-07-24 NOTE — PROGRESS NOTES
Jm Costa - [facility]  Thoracic Surgery  Progress Note    Subjective:     History of Present Illness:  No notes on file    Post-Op Info:  Procedure(s) (LRB):  LYMPHADENECTOMY (Left)  BLOCK, NERVE, INTERCOSTAL, 2 OR MORE (Left)  LYSIS, ADHESIONS (Left)  XI ROBOTIC WEDGE RESECTION, LUNG (RATS) (Left)   1 Day Post-Op     Interval History: NAEON. Expected postop incisional pain. AVSS with rate controlled afib on amio gtt. Chest tube with 210 mL of SS output.     Medications:  Continuous Infusions:   amiodarone in dextrose 5%  0.5 mg/min Intravenous Continuous 16.7 mL/hr at 07/23/24 2259 0.5 mg/min at 07/23/24 2259     Scheduled Meds:   acetaminophen  1,000 mg Oral Q8H    aspirin  81 mg Oral Daily    enoxparin  40 mg Subcutaneous Daily    gabapentin  300 mg Oral QHS    levalbuterol  1.25 mg Nebulization Q12H    methocarbamoL  500 mg Oral QID    metoprolol tartrate  12.5 mg Oral BID    polyethylene glycol  17 g Oral Daily    senna-docusate 8.6-50 mg  1 tablet Oral BID    sertraline  100 mg Oral QHS     PRN Meds:  Current Facility-Administered Medications:     bisacodyL, 10 mg, Rectal, Daily PRN    dextrose 10%, 12.5 g, Intravenous, PRN    dextrose 10%, 25 g, Intravenous, PRN    HYDROmorphone, 0.5 mg, Intravenous, Q6H PRN    metoprolol, 5 mg, Intravenous, Q5 Min PRN    ondansetron, 8 mg, Oral, Q8H PRN    oxyCODONE, 5 mg, Oral, Q4H PRN    oxyCODONE, 10 mg, Oral, Q4H PRN    traZODone, 50 mg, Oral, Nightly PRN     Review of patient's allergies indicates:  No Known Allergies  Objective:     Vital Signs (Most Recent):  Temp: 99.8 °F (37.7 °C) (07/24/24 0712)  Pulse: 101 (07/24/24 0816)  Resp: 20 (07/24/24 0816)  BP: 110/75 (07/24/24 0712)  SpO2: (!) 94 % (07/24/24 0816) Vital Signs (24h Range):  Temp:  [97 °F (36.1 °C)-99.8 °F (37.7 °C)] 99.8 °F (37.7 °C)  Pulse:  [] 101  Resp:  [12-29] 20  SpO2:  [87 %-100 %] 94 %  BP: (103-165)/() 110/75     Intake/Output - Last 3 Shifts         07/22 0700 07/23 0659 07/23 0700 07/24  0659 07/24 0700  07/25 0659    P.O.  220     I.V. (mL/kg)  53.8 (0.7)     IV Piggyback  1412     Total Intake(mL/kg)  1685.9 (20.8)     Urine (mL/kg/hr)  800 (0.4)     Chest Tube  210     Total Output  1010     Net  +675.9            Urine Occurrence  1 x 1 x            SpO2: (!) 94 %        Physical Exam  Vitals reviewed.   Constitutional:       Appearance: Normal appearance.   Cardiovascular:      Rate and Rhythm: Normal rate.      Pulses: Normal pulses.   Pulmonary:      Effort: Pulmonary effort is normal.      Comments: Chest tube to water seal with minimal forced expiratory air leak. Tidaling appropriately.  Abdominal:      Palpations: Abdomen is soft.      Tenderness: There is no abdominal tenderness.   Skin:     General: Skin is warm and dry.   Neurological:      General: No focal deficit present.      Mental Status: She is alert and oriented to person, place, and time.            Significant Labs:  All pertinent labs from the last 24 hours have been reviewed.    Significant Diagnostics:  I have reviewed all pertinent imaging results/findings within the past 24 hours.    VTE Risk Mitigation (From admission, onward)           Ordered     enoxaparin injection 40 mg  Daily         07/23/24 1146     IP VTE HIGH RISK PATIENT  Once         07/23/24 1146     Place ALDEN hose  Until discontinued         07/23/24 1146     Place sequential compression device  Until discontinued         07/23/24 1146                  Assessment/Plan:     * Malignant neoplasm of upper lobe of lung  63 yoF with history of afib s/p AMBER wedge resection for NSCLC on 7/23. Progressing appropriately.    - Follow up morning cxr  - Keep chest tube to water seal today  - Daily CXR  - Continue amio gtt at 0.5, if doesn't convert, may discharge on rate control rather than rhythm control  - Ambulate in the halls  - Up to chair for all meals  - Continue current pain regimen        Bryan Arnold MD  Thoracic Surgery  [facility]

## 2024-07-24 NOTE — ASSESSMENT & PLAN NOTE
63 yoF with history of afib s/p AMBER wedge resection for NSCLC on 7/23. Progressing appropriately.    - Follow up morning cxr  - Keep chest tube to water seal today  - Daily CXR  - Continue amio gtt at 0.5, if doesn't convert, may discharge on rate control rather than rhythm control  - Ambulate in the halls  - Up to chair for all meals  - Continue current pain regimen

## 2024-07-24 NOTE — PROGRESS NOTES
As RN calling report pt's HR jumps back to 130 and sustaining for minutes at a time. Pt asymptomatic. Pt's chest discomfort has improved. CXR okay according to surgery team. Pt was attempting to urinate with Purewick as HR went up, but no discomfort just states she is unable to go in bed and doesn't feel much of an urge. Bladder scan done only showing a volume of 60 mL max, no bladder distention. Pt tolerating liquids by mouth. States incisional pain is about a 3/10. IV Metoprolol 5 mg given per Randolph SCANLON and 12.5 mg by mouth. No change from IVP Metoprolol. *Handed off to Sarah, PACU RN @ 2100*

## 2024-07-24 NOTE — CARE UPDATE
Paged around 20:30 for vague chest discomfort and tachycardia. Patient seen and examined. Apart from the tachycardia, her vital signs were stable. She recently began amiodarone drip. Chest tube was set to water seal with no air leak appreciated. My suspicion is chest pain related to recent major thoracic surgery, but out of abundance of caution ordered repeat CXR and troponin.    Paged a second time around 03:15 because patient accidentally pulled on her chest tube as she was attempting to ambulate to the restroom. Patient seen and examined. Satting in the low 90s on RA, non labored breathing on water seal. Pneumovac was changed and noted to be tidaling with air leak to +4 only on expiration. Length of the chest tube and all connections examined with no sign of potential for leak. No blood around chest wall. For this I ordered a CXR and will follow up on the results.

## 2024-07-24 NOTE — NURSING TRANSFER
Nursing Transfer Note      7/23/2024   11:00 PM    Nurse giving handoff:Sarah RN PACU  Nurse receiving handoff:Jazzy BRONSON    Reason patient is being transferred: s/p anesthesia recovery    Transfer To: Room 1062    Transfer via bed    Transfer with cardiac monitoring    Transported by this RN    Transfer Vital Signs: see flowsheets    Telemetry: Box Number [id number], Rate 130, and Rhythm aflutter  Order for Tele Monitor? Yes    Additional Lines: Chest Tube    Medicines sent: amiodarone infusing    Any special needs or follow-up needed: none    Patient belongings transferred with patient: Yes    Chart send with patient: Yes    Notified: friend    Patient reassessed at: 7/23/24 (date, time)    Upon arrival to floor: cardiac monitor applied, patient oriented to room, call bell in reach, and bed in lowest position

## 2024-07-24 NOTE — PLAN OF CARE
Plan of care reviewed with patient. Call light within reach, fall precautions maintained bed alarm set. Patient made aware. Nurse instructed patient to call for assistance. Patient verbalized understanding. Telemetry monitor throughout shift. NAD noted. Will continue to monitor and continue plan of care.       Problem: Adult Inpatient Plan of Care  Goal: Plan of Care Review  Outcome: Progressing  Goal: Patient-Specific Goal (Individualized)  Outcome: Progressing  Goal: Absence of Hospital-Acquired Illness or Injury  Outcome: Progressing  Goal: Optimal Comfort and Wellbeing  Outcome: Progressing  Goal: Readiness for Transition of Care  Outcome: Progressing     Problem: Wound  Goal: Optimal Coping  Outcome: Progressing  Goal: Optimal Functional Ability  Outcome: Progressing  Goal: Absence of Infection Signs and Symptoms  Outcome: Progressing  Goal: Improved Oral Intake  Outcome: Progressing  Goal: Optimal Pain Control and Function  Outcome: Progressing  Goal: Skin Health and Integrity  Outcome: Progressing  Goal: Optimal Wound Healing  Outcome: Progressing     Problem: Fall Injury Risk  Goal: Absence of Fall and Fall-Related Injury  Outcome: Progressing

## 2024-07-24 NOTE — NURSING
"...[facility] Plan of Care Note    Dx:   Malignant neoplasm of upper lobe of left lung [C34.12]    Shift Events: Patient up without assistance to restroom...pulled out Left FA PIV and dislocated tubing for the chest tube re secured chamber system . Immediately changed chamber system connections tightened and tap. New dressing applied.   Pain medication provided.  MD Notified and reported to bedside.  Amiodarone running as ordered. Patient vitals remain stable.  Xray obtained. No new orders.      Goals of Care: Pain management and safety     Neuro: A&Ox4    Vital Signs: /68 (BP Location: Left arm, Patient Position: Lying)   Pulse 95   Temp 97.7 °F (36.5 °C) (Oral)   Resp 18   Ht 5' 3" (1.6 m)   Wt 76.5 kg (168 lb 10.4 oz)   SpO2 (!) 92%   Breastfeeding No   BMI 29.88 kg/m²     Respiratory: Room Air    Diet: Diet Adult Regular      Is patient tolerating current diet? Yes    GTTS: Amiodarone    Urine Output/Bowel Movement:   I/O this shift:  In: 385.9 [P.O.:220; I.V.:53.8; IV Piggyback:112]  Out: 1010 [Urine:800; Chest Tube:210]  Last Bowel Movement: 07/22/24      Drains/Tubes/Tube Feeds (include total output/shift):   I/O this shift:  In: 385.9 [P.O.:220; I.V.:53.8; IV Piggyback:112]  Out: 1010 [Urine:800; Chest Tube:210]      Lines: PIV       Accuchecks: No    Skin: Surgical incision.  See Flowsheet     Fall Risk Score: See Chart     Activity level? No Assistance needed     Any scheduled procedures? None at this time    Any safety concerns? Fall Prevention. Patient     Other:    "

## 2024-07-24 NOTE — PLAN OF CARE
Jm [facility]  Discharge Assessment    Primary Care Provider: No, Primary Doctor     Discharge Assessment (most recent)       BRIEF DISCHARGE ASSESSMENT - 07/24/24 1203          Discharge Planning    Assessment Type Discharge Planning Brief Assessment     Resource/Environmental Concerns none     Support Systems Friends/neighbors     Equipment Currently Used at Home none     Current Living Arrangements home     Patient/Family Anticipates Transition to home     Patient/Family Anticipated Services at Transition none     DME Needed Upon Discharge  none     Discharge Plan A Home     Discharge Plan B Home        Housing Stability    At any time in the past 12 months, were you homeless or living in a shelter (including now)? No        Transportation Needs    Has the lack of transportation kept you from medical appointments, meetings, work or from getting things needed for daily living? No        Social Isolation    How often do you feel lonely or isolated from those around you?  Rarely        Health Literacy    How often do you need to have someone help you when you read instructions, pamphlets, or other written material from your doctor or pharmacy? Rarely                   Info gathered from chart.Discharge Plan A and Plan B have been determined by review of patient's clinical status, future medical and therapeutic needs, and coverage/benefits for post-acute care in coordination with multidisciplinary team members.    Latia Pérez LCSW  Case Management/[facility]  [phone]

## 2024-07-25 ENCOUNTER — PATIENT MESSAGE (OUTPATIENT)
Dept: CARDIOTHORACIC SURGERY | Facility: CLINIC | Age: 64
End: 2024-07-25
Payer: COMMERCIAL

## 2024-07-25 VITALS
OXYGEN SATURATION: 92 % | TEMPERATURE: 99 F | HEIGHT: 63 IN | SYSTOLIC BLOOD PRESSURE: 121 MMHG | HEART RATE: 111 BPM | BODY MASS INDEX: 31.68 KG/M2 | WEIGHT: 178.81 LBS | DIASTOLIC BLOOD PRESSURE: 56 MMHG | RESPIRATION RATE: 18 BRPM

## 2024-07-25 DIAGNOSIS — C34.12 MALIGNANT NEOPLASM OF UPPER LOBE OF LEFT LUNG: Primary | ICD-10-CM

## 2024-07-25 LAB
FINAL PATHOLOGIC DIAGNOSIS: NORMAL
GROSS: NORMAL
Lab: NORMAL
MICROSCOPIC EXAM: NORMAL

## 2024-07-25 PROCEDURE — 94799 UNLISTED PULMONARY SVC/PX: CPT

## 2024-07-25 PROCEDURE — 63600175 PHARM REV CODE 636 W HCPCS: Performed by: PHYSICIAN ASSISTANT

## 2024-07-25 PROCEDURE — 94761 N-INVAS EAR/PLS OXIMETRY MLT: CPT

## 2024-07-25 PROCEDURE — 25000242 PHARM REV CODE 250 ALT 637 W/ HCPCS: Performed by: PHYSICIAN ASSISTANT

## 2024-07-25 PROCEDURE — 94640 AIRWAY INHALATION TREATMENT: CPT

## 2024-07-25 PROCEDURE — 25000003 PHARM REV CODE 250

## 2024-07-25 RX ORDER — METHOCARBAMOL 500 MG/1
500 TABLET, FILM COATED ORAL 4 TIMES DAILY
Qty: 40 TABLET | Refills: 0 | Status: SHIPPED | OUTPATIENT
Start: 2024-07-25 | End: 2024-08-04

## 2024-07-25 RX ORDER — IBUPROFEN 400 MG/1
400 TABLET ORAL EVERY 6 HOURS PRN
COMMUNITY
Start: 2024-07-25

## 2024-07-25 RX ORDER — METOCLOPRAMIDE HYDROCHLORIDE 5 MG/ML
5 INJECTION INTRAMUSCULAR; INTRAVENOUS EVERY 8 HOURS PRN
Status: DISCONTINUED | OUTPATIENT
Start: 2024-07-25 | End: 2024-07-25 | Stop reason: HOSPADM

## 2024-07-25 RX ORDER — OXYCODONE HYDROCHLORIDE 5 MG/1
5 TABLET ORAL EVERY 4 HOURS PRN
Qty: 10 TABLET | Refills: 0 | Status: SHIPPED | OUTPATIENT
Start: 2024-07-25

## 2024-07-25 RX ORDER — ONDANSETRON HYDROCHLORIDE 2 MG/ML
4 INJECTION, SOLUTION INTRAVENOUS ONCE
Status: COMPLETED | OUTPATIENT
Start: 2024-07-25 | End: 2024-07-25

## 2024-07-25 RX ORDER — ACETAMINOPHEN 500 MG
500 TABLET ORAL EVERY 6 HOURS PRN
COMMUNITY
Start: 2024-07-25

## 2024-07-25 RX ORDER — OXYCODONE HYDROCHLORIDE 5 MG/1
5 TABLET ORAL EVERY 4 HOURS PRN
Qty: 10 TABLET | Refills: 0 | Status: SHIPPED | OUTPATIENT
Start: 2024-07-25 | End: 2024-07-25

## 2024-07-25 RX ADMIN — OXYCODONE HYDROCHLORIDE 10 MG: 10 TABLET ORAL at 08:07

## 2024-07-25 RX ADMIN — LEVALBUTEROL 1.25 MG: 1.25 SOLUTION, CONCENTRATE RESPIRATORY (INHALATION) at 08:07

## 2024-07-25 RX ADMIN — POLYETHYLENE GLYCOL 3350 17 G: 17 POWDER, FOR SOLUTION ORAL at 08:07

## 2024-07-25 RX ADMIN — ACETAMINOPHEN 1000 MG: 500 TABLET ORAL at 01:07

## 2024-07-25 RX ADMIN — AMIODARONE HYDROCHLORIDE 0.5 MG/MIN: 1.8 INJECTION, SOLUTION INTRAVENOUS at 12:07

## 2024-07-25 RX ADMIN — ONDANSETRON 4 MG: 2 INJECTION INTRAMUSCULAR; INTRAVENOUS at 03:07

## 2024-07-25 RX ADMIN — ASPIRIN 81 MG: 81 TABLET, COATED ORAL at 08:07

## 2024-07-25 RX ADMIN — METOPROLOL TARTRATE 12.5 MG: 25 TABLET, FILM COATED ORAL at 08:07

## 2024-07-25 RX ADMIN — METHOCARBAMOL 500 MG: 500 TABLET ORAL at 08:07

## 2024-07-25 RX ADMIN — OXYCODONE HYDROCHLORIDE 10 MG: 10 TABLET ORAL at 03:07

## 2024-07-25 RX ADMIN — OXYCODONE 5 MG: 5 TABLET ORAL at 02:07

## 2024-07-25 RX ADMIN — METHOCARBAMOL 500 MG: 500 TABLET ORAL at 01:07

## 2024-07-25 RX ADMIN — SENNOSIDES AND DOCUSATE SODIUM 1 TABLET: 50; 8.6 TABLET ORAL at 08:07

## 2024-07-25 NOTE — PLAN OF CARE
"[facility] Plan of Care Note    Dx:   Malignant neoplasm of upper lobe of left lung [C34.12]    Shift Events: Peripheral IV leaking, restarted new one to R AC    Goals of Care: chest tube discontinuation    Neuro: RA    Vital Signs: BP (!) 102/50 (BP Location: Left arm, Patient Position: Lying)   Pulse 96   Temp 97.3 °F (36.3 °C) (Oral)   Resp 18   Ht 5' 3" (1.6 m)   Wt 81 kg (178 lb 9.2 oz)   SpO2 97%   Breastfeeding No   BMI 31.63 kg/m²     Respiratory: 1 LNC    Diet: Diet Adult Regular      Is patient tolerating current diet? yes    GTTS: amiodarone 16.7 mL/hr    Urine Output/Bowel Movement:   I/O this shift:  In: -   Out: 70 [Chest Tube:70]  Last Bowel Movement: 07/24/24      Drains/Tubes/Tube Feeds (include total output/shift):   I/O this shift:  In: -   Out: 70 [Chest Tube:70]      Lines:  1 PIV    Accuchecks: n/a    Skin: left chest tube, 3 lap sites    Fall Risk Score: 11    Activity level? Standby assist    Any scheduled procedures?  N/a    Any safety concerns? Fall risk    Other: n/a     Problem: Adult Inpatient Plan of Care  Goal: Plan of Care Review  Outcome: Progressing  Goal: Patient-Specific Goal (Individualized)  Outcome: Progressing  Goal: Absence of Hospital-Acquired Illness or Injury  Outcome: Progressing  Goal: Optimal Comfort and Wellbeing  Outcome: Progressing  Goal: Readiness for Transition of Care  Outcome: Progressing     Problem: Wound  Goal: Optimal Coping  Outcome: Progressing  Goal: Optimal Functional Ability  Outcome: Progressing  Goal: Absence of Infection Signs and Symptoms  Outcome: Progressing  Goal: Improved Oral Intake  Outcome: Progressing  Goal: Optimal Pain Control and Function  Outcome: Progressing  Goal: Skin Health and Integrity  Outcome: Progressing  Goal: Optimal Wound Healing  Outcome: Progressing     Problem: Fall Injury Risk  Goal: Absence of Fall and Fall-Related Injury  Outcome: Progressing     "

## 2024-07-25 NOTE — HOSPITAL COURSE
Ms. Baca underwent a AMBER wedge resection for NSCLC with Dr. Sharma on 7/23. She tolerated the procedure well and progressed appropriately. Chest tube was removed on POD 2. On 7/25 she was otherwise medically stable for discharge. We will follow up with her in clinic in 2 weeks.

## 2024-07-25 NOTE — SUBJECTIVE & OBJECTIVE
Interval History: NAEON. AVSS in rate-controlled atrial flutter. Room air. Chest tube with low output overnight.     Medications:  Continuous Infusions:   amiodarone in dextrose 5%  0.5 mg/min Intravenous Continuous 16.7 mL/hr at 07/25/24 0025 0.5 mg/min at 07/25/24 0025     Scheduled Meds:   acetaminophen  1,000 mg Oral Q8H    aspirin  81 mg Oral Daily    enoxparin  40 mg Subcutaneous Daily    gabapentin  300 mg Oral QHS    levalbuterol  1.25 mg Nebulization Q12H    methocarbamoL  500 mg Oral QID    metoprolol tartrate  12.5 mg Oral BID    polyethylene glycol  17 g Oral Daily    senna-docusate 8.6-50 mg  1 tablet Oral BID    sertraline  100 mg Oral QHS     PRN Meds:  Current Facility-Administered Medications:     bisacodyL, 10 mg, Rectal, Daily PRN    dextrose 10%, 12.5 g, Intravenous, PRN    dextrose 10%, 25 g, Intravenous, PRN    HYDROmorphone, 0.5 mg, Intravenous, Q6H PRN    metoprolol, 5 mg, Intravenous, Q5 Min PRN    ondansetron, 8 mg, Oral, Q8H PRN    oxyCODONE, 5 mg, Oral, Q4H PRN    oxyCODONE, 10 mg, Oral, Q4H PRN    traZODone, 50 mg, Oral, Nightly PRN     Review of patient's allergies indicates:  No Known Allergies  Objective:     Vital Signs (Most Recent):  Temp: (!) 101.9 °F (38.8 °C) (07/25/24 0752)  Pulse: 105 (07/25/24 0852)  Resp: 18 (07/25/24 0852)  BP: 127/72 (07/25/24 0834)  SpO2: (!) 94 % (07/25/24 0852) Vital Signs (24h Range):  Temp:  [97.3 °F (36.3 °C)-101.9 °F (38.8 °C)] 101.9 °F (38.8 °C)  Pulse:  [] 105  Resp:  [16-20] 18  SpO2:  [89 %-97 %] 94 %  BP: ()/(50-72) 127/72     Intake/Output - Last 3 Shifts         07/23 0700 07/24 0659 07/24 0700 07/25 0659 07/25 0700 07/26 0659    P.O. 220      I.V. (mL/kg) 53.8 (0.7)      IV Piggyback 1412      Total Intake(mL/kg) 1685.9 (20.8)      Urine (mL/kg/hr) 800 (0.4) 0 (0) 650 (3.9)    Stool  0     Chest Tube 210 250     Total Output 1010 250 650    Net +675.9 -250 -650           Urine Occurrence 1 x 2 x     Stool Occurrence  1 x              SpO2: (!) 94 %        Physical Exam  Vitals reviewed.   Constitutional:       Appearance: Normal appearance.   Cardiovascular:      Rate and Rhythm: Normal rate.      Pulses: Normal pulses.   Pulmonary:      Effort: Pulmonary effort is normal.      Comments: Chest tube to water seal with no air leak. Tidaling appropriately.  Abdominal:      Palpations: Abdomen is soft.      Tenderness: There is no abdominal tenderness.   Skin:     General: Skin is warm and dry.   Neurological:      General: No focal deficit present.      Mental Status: She is alert and oriented to person, place, and time.            Significant Labs:  All pertinent labs from the last 24 hours have been reviewed.    Significant Diagnostics:  I have reviewed all pertinent imaging results/findings within the past 24 hours.    VTE Risk Mitigation (From admission, onward)           Ordered     enoxaparin injection 40 mg  Daily         07/23/24 1146     IP VTE HIGH RISK PATIENT  Once         07/23/24 1146     Place ALDEN hose  Until discontinued         07/23/24 1146     Place sequential compression device  Until discontinued         07/23/24 1146

## 2024-07-25 NOTE — DISCHARGE SUMMARY
Jm Costa - [facility]  Thoracic Surgery  Discharge Summary    Patient Name: Keri Baca  MRN: [id number]  Admission Date: 7/23/2024  Hospital Length of Stay: 2 days  Discharge Date and Time:  07/25/2024 1:45 PM  Attending Physician: Rubin Sharma MD   Discharging Provider: Bryan Arnold MD  Primary Care Provider: No, Primary Doctor    HPI:   No notes on file    Procedure(s) (LRB):  LYMPHADENECTOMY (Left)  BLOCK, NERVE, INTERCOSTAL, 2 OR MORE (Left)  LYSIS, ADHESIONS (Left)  XI ROBOTIC WEDGE RESECTION, LUNG (RATS) (Left)      Hospital Course: Ms. Baca underwent a AMBER wedge resection for NSCLC with Dr. Sharma on 7/23. She tolerated the procedure well and progressed appropriately. Chest tube was removed on POD 2. On 7/25 she was otherwise medically stable for discharge. We will follow up with her in clinic in 2 weeks.    Goals of Care Treatment Preferences:  Code Status: Full Code          Significant Diagnostic Studies: N/A    Pending Diagnostic Studies:       None          Final Active Diagnoses:    Diagnosis Date Noted POA    PRINCIPAL PROBLEM:  Malignant neoplasm of upper lobe of lung [C34.10] 06/28/2024 Yes      Problems Resolved During this Admission:      Discharged Condition: good    Disposition: Home or Self Care    Follow Up:   Follow-up Information       Rubin Sharma MD Follow up in 2 week(s).    Specialty: Thoracic Diseases  Contact information:  [address]  [facility] [id number]  [phone]                           Patient Instructions:      Lifting restrictions   Order Comments: No lifting greater than 15 pounds for 4 weeks     Notify your health care provider if you experience any of the following:  temperature >100.4     Notify your health care provider if you experience any of the following:  persistent nausea and vomiting or diarrhea     Notify your health care provider if you experience any of the following:  severe uncontrolled pain     Notify your health care  provider if you experience any of the following:  redness, tenderness, or signs of infection (pain, swelling, redness, odor or green/yellow discharge around incision site)     Notify your health care provider if you experience any of the following:  difficulty breathing or increased cough     Remove dressing in 48 hours   Order Comments: Ok to shower. No bathing or soaking of the wound for 2 weeks. Steri strips will fall off on their own in 1-2 weeks.     Activity as tolerated     Medications:  Reconciled Home Medications:      Medication List        START taking these medications      acetaminophen 500 MG tablet  Commonly known as: TYLENOL  Take 1 tablet (500 mg total) by mouth every 6 (six) hours as needed for Pain.     ibuprofen 400 MG tablet  Commonly known as: ADVIL,MOTRIN  Take 1 tablet (400 mg total) by mouth every 6 (six) hours as needed for Other (pain). Alternate with tylenol     methocarbamoL 500 MG Tab  Commonly known as: ROBAXIN  Take 1 tablet (500 mg total) by mouth 4 (four) times daily. for 10 days     oxyCODONE 5 MG immediate release tablet  Commonly known as: ROXICODONE  Take 1 tablet (5 mg total) by mouth every 4 (four) hours as needed for Pain (if pain is not relieved by alternating tylenol and ibuprofen).            CONTINUE taking these medications      aspirin 81 MG EC tablet  Commonly known as: ECOTRIN  81 mg.     magnesium oxide 400 mg (241.3 mg magnesium) tablet  Commonly known as: MAG-OX  Take 1 tablet by mouth 2 (two) times daily.     metoprolol succinate 25 MG 24 hr tablet  Commonly known as: TOPROL-XL  = 3 tab, Oral, Daily, # 270 tab, 0 Refill(s), Maintenance, Pharmacy: [facility][id number], 161, cm, 03/13/24 9:16:00 CDT, Height/Length Measured, 74.4, kg, 03/13/24 9:16:00 CDT, Weight Dosing     sertraline 100 MG tablet  Commonly known as: ZOLOFT  Take 100 mg by mouth every evening.     traZODone 50 MG tablet  Commonly known as: DESYREL  Take 50 mg by mouth nightly as needed.      XARELTO 20 mg Tab  Generic drug: rivaroxaban  Take 20 mg by mouth nightly.              Bryan Arnold MD  Thoracic Surgery  [facility]

## 2024-07-25 NOTE — ASSESSMENT & PLAN NOTE
63 yoF with history of afib s/p AMBER wedge resection for NSCLC on 7/23. Progressing appropriately.    - Clamp trial of chest tube this morning  - CXR at noon  - Ambulate in the halls  - Up to chair for all meals  - Continue current pain regimen  - Likely discharge today if passes clamp trial and chest tube removed

## 2024-07-25 NOTE — PROGRESS NOTES
Jm Costa - [facility]  Thoracic Surgery  Progress Note    Subjective:     History of Present Illness:  No notes on file    Post-Op Info:  Procedure(s) (LRB):  LYMPHADENECTOMY (Left)  BLOCK, NERVE, INTERCOSTAL, 2 OR MORE (Left)  LYSIS, ADHESIONS (Left)  XI ROBOTIC WEDGE RESECTION, LUNG (RATS) (Left)   2 Days Post-Op     Interval History: NAEON. AVSS in rate-controlled atrial flutter. Room air. Chest tube with low output overnight.     Medications:  Continuous Infusions:   amiodarone in dextrose 5%  0.5 mg/min Intravenous Continuous 16.7 mL/hr at 07/25/24 0025 0.5 mg/min at 07/25/24 0025     Scheduled Meds:   acetaminophen  1,000 mg Oral Q8H    aspirin  81 mg Oral Daily    enoxparin  40 mg Subcutaneous Daily    gabapentin  300 mg Oral QHS    levalbuterol  1.25 mg Nebulization Q12H    methocarbamoL  500 mg Oral QID    metoprolol tartrate  12.5 mg Oral BID    polyethylene glycol  17 g Oral Daily    senna-docusate 8.6-50 mg  1 tablet Oral BID    sertraline  100 mg Oral QHS     PRN Meds:  Current Facility-Administered Medications:     bisacodyL, 10 mg, Rectal, Daily PRN    dextrose 10%, 12.5 g, Intravenous, PRN    dextrose 10%, 25 g, Intravenous, PRN    HYDROmorphone, 0.5 mg, Intravenous, Q6H PRN    metoprolol, 5 mg, Intravenous, Q5 Min PRN    ondansetron, 8 mg, Oral, Q8H PRN    oxyCODONE, 5 mg, Oral, Q4H PRN    oxyCODONE, 10 mg, Oral, Q4H PRN    traZODone, 50 mg, Oral, Nightly PRN     Review of patient's allergies indicates:  No Known Allergies  Objective:     Vital Signs (Most Recent):  Temp: (!) 101.9 °F (38.8 °C) (07/25/24 0752)  Pulse: 105 (07/25/24 0852)  Resp: 18 (07/25/24 0852)  BP: 127/72 (07/25/24 0834)  SpO2: (!) 94 % (07/25/24 0852) Vital Signs (24h Range):  Temp:  [97.3 °F (36.3 °C)-101.9 °F (38.8 °C)] 101.9 °F (38.8 °C)  Pulse:  [] 105  Resp:  [16-20] 18  SpO2:  [89 %-97 %] 94 %  BP: ()/(50-72) 127/72     Intake/Output - Last 3 Shifts         07/23 0700 07/24 0659 07/24 0700 07/25 0659 07/25  0700  07/26 0659    P.O. 220      I.V. (mL/kg) 53.8 (0.7)      IV Piggyback 1412      Total Intake(mL/kg) 1685.9 (20.8)      Urine (mL/kg/hr) 800 (0.4) 0 (0) 650 (3.9)    Stool  0     Chest Tube 210 250     Total Output 1010 250 650    Net +675.9 -250 -650           Urine Occurrence 1 x 2 x     Stool Occurrence  1 x             SpO2: (!) 94 %        Physical Exam  Vitals reviewed.   Constitutional:       Appearance: Normal appearance.   Cardiovascular:      Rate and Rhythm: Normal rate.      Pulses: Normal pulses.   Pulmonary:      Effort: Pulmonary effort is normal.      Comments: Chest tube to water seal with no air leak. Tidaling appropriately.  Abdominal:      Palpations: Abdomen is soft.      Tenderness: There is no abdominal tenderness.   Skin:     General: Skin is warm and dry.   Neurological:      General: No focal deficit present.      Mental Status: She is alert and oriented to person, place, and time.            Significant Labs:  All pertinent labs from the last 24 hours have been reviewed.    Significant Diagnostics:  I have reviewed all pertinent imaging results/findings within the past 24 hours.    VTE Risk Mitigation (From admission, onward)           Ordered     enoxaparin injection 40 mg  Daily         07/23/24 1146     IP VTE HIGH RISK PATIENT  Once         07/23/24 1146     Place ALDEN hose  Until discontinued         07/23/24 1146     Place sequential compression device  Until discontinued         07/23/24 1146                  Assessment/Plan:     * Malignant neoplasm of upper lobe of lung  63 yoF with history of afib s/p AMBER wedge resection for NSCLC on 7/23. Progressing appropriately.    - Clamp trial of chest tube this morning  - CXR at noon  - Ambulate in the halls  - Up to chair for all meals  - Continue current pain regimen  - Likely discharge today if passes clamp trial and chest tube removed        Bryan Arnold MD  Thoracic Surgery  [facility]

## 2024-07-25 NOTE — PLAN OF CARE
[facility]  Discharge Final Note    Primary Care Provider: No, Primary Doctor    Expected Discharge Date: 7/25/2024    Final Discharge Note (most recent)       Final Note - 07/25/24 1426          Final Note    Assessment Type Final Discharge Note     Anticipated Discharge Disposition Home or Self Care     Hospital Resources/Appts/Education Provided Provided patient/caregiver with written discharge plan information        Post-Acute Status    Patient choice form signed by patient/caregiver List with quality metrics by geographic area provided     Discharge Delays None known at this time                     Important Message from Medicare             Contact Info       Rubin Sharma MD   Specialty: Thoracic Diseases    [address] [address]   Phone: [phone]       Next Steps: Follow up in 2 week(s)          Pt d/c home with family. No d/c needs reported by medical team at this time.     Latia Pérez LCSW  Case Management/[facility]  [phone]

## 2024-07-25 NOTE — CARE UPDATE
"RAPID RESPONSE NURSE CHART REVIEW        Chart Reviewed: 07/25/2024, 7:55 AM    MRN: [id number]  Bed: 1062/1062 A    Dx: Malignant neoplasm of upper lobe of lung    Keri Baca has a past medical history of A-fib, History of alcoholism, History of fracture of toe, Hypertension, Moderate obstructive sleep apnea, Palpitation, Plantar fasciitis, and PLMD (periodic limb movement disorder).    Last VS: /72 (BP Location: Left arm, Patient Position: Lying)   Pulse (!) 121   Temp (!) 101.9 °F (38.8 °C) (Oral)   Resp 16   Ht 5' 3" (1.6 m)   Wt 81.1 kg (178 lb 12.7 oz)   SpO2 95%   Breastfeeding No   BMI 31.67 kg/m²     24H Vital Sign Range:  Temp:  [97.3 °F (36.3 °C)-101.9 °F (38.8 °C)]   Pulse:  []   Resp:  [16-20]   BP: ()/(50-72)   SpO2:  [89 %-97 %]     Level of Consciousness (AVPU): alert    Recent Labs     07/23/24  0617 07/23/24  1047   WBC 4.61  --    HGB 11.7*  --    HCT 36.0* 35*   *  --        Recent Labs     07/23/24  0617 07/23/24  1504    137   K 3.8 4.0    108   CO2 22* 21*   BUN 19 16   CREATININE 0.8 0.7   GLU 98 133*        Recent Labs     07/23/24  1047   PH 7.297*   PCO2 44.0   PO2 85   HCO3 21.5*   POCSATURATED 95   BE -5*        OXYGEN:  Flow (L/min) (Oxygen Therapy): 1    MEWS score: 1    Charge RN, [facility] contacted for fever and tachycardia reports patient remains on an Amio gtt at this time. Antipyretics given.  No additional concerns verbalized at this time. Instructed to call [phone] for further concerns or assistance.    Sonia Washington RN        "

## 2024-08-07 ENCOUNTER — HOSPITAL ENCOUNTER (OUTPATIENT)
Dept: RADIOLOGY | Facility: HOSPITAL | Age: 64
Discharge: HOME | End: 2024-08-07
Attending: THORACIC SURGERY (CARDIOTHORACIC VASCULAR SURGERY)
Payer: COMMERCIAL

## 2024-08-07 ENCOUNTER — OFFICE VISIT (OUTPATIENT)
Dept: CARDIOTHORACIC SURGERY | Facility: CLINIC | Age: 64
End: 2024-08-07
Attending: THORACIC SURGERY (CARDIOTHORACIC VASCULAR SURGERY)
Payer: COMMERCIAL

## 2024-08-07 VITALS
OXYGEN SATURATION: 96 % | BODY MASS INDEX: 30.2 KG/M2 | SYSTOLIC BLOOD PRESSURE: 108 MMHG | DIASTOLIC BLOOD PRESSURE: 80 MMHG | WEIGHT: 170.44 LBS | HEIGHT: 63 IN | HEART RATE: 101 BPM

## 2024-08-07 DIAGNOSIS — C34.12 MALIGNANT NEOPLASM OF UPPER LOBE OF LEFT LUNG: Primary | ICD-10-CM

## 2024-08-07 DIAGNOSIS — C34.12 MALIGNANT NEOPLASM OF UPPER LOBE OF LEFT LUNG: ICD-10-CM

## 2024-08-07 PROCEDURE — 99999 PR PBB SHADOW E&M-EST. PATIENT-LVL III: CPT | Mod: PBBFAC,,, | Performed by: THORACIC SURGERY (CARDIOTHORACIC VASCULAR SURGERY)

## 2024-08-07 PROCEDURE — 99024 POSTOP FOLLOW-UP VISIT: CPT | Mod: S$GLB,,, | Performed by: THORACIC SURGERY (CARDIOTHORACIC VASCULAR SURGERY)

## 2024-08-07 PROCEDURE — 3079F DIAST BP 80-89 MM HG: CPT | Mod: CPTII,S$GLB,, | Performed by: THORACIC SURGERY (CARDIOTHORACIC VASCULAR SURGERY)

## 2024-08-07 PROCEDURE — 1159F MED LIST DOCD IN RCRD: CPT | Mod: CPTII,S$GLB,, | Performed by: THORACIC SURGERY (CARDIOTHORACIC VASCULAR SURGERY)

## 2024-08-07 PROCEDURE — 71046 X-RAY EXAM CHEST 2 VIEWS: CPT | Mod: 26,,, | Performed by: RADIOLOGY

## 2024-08-07 PROCEDURE — 3074F SYST BP LT 130 MM HG: CPT | Mod: CPTII,S$GLB,, | Performed by: THORACIC SURGERY (CARDIOTHORACIC VASCULAR SURGERY)

## 2024-08-07 PROCEDURE — 71046 X-RAY EXAM CHEST 2 VIEWS: CPT | Mod: TC

## 2024-09-04 ENCOUNTER — PATIENT MESSAGE (OUTPATIENT)
Dept: RESEARCH | Facility: HOSPITAL | Age: 64
End: 2024-09-04
Payer: COMMERCIAL

## 2025-02-03 NOTE — PROGRESS NOTES
Subjective     Patient ID: Keri Baca is a 64 y.o. female.    Chief Complaint: No chief complaint on file.    Diagnosis:  NSCLC    Pre-operative therapy: none     Procedure(s) and date(s): left robotic assisted apicoposterior wedge with MLND    Pathology: 2.5 cm well differentiated adenocarcinoma. Levels 5,7,9,10 = negative. pT1cN0. Margins negative     Post-operative therapy: surveillance     HPI  64 y.o.  female former smoker with NICM recovered EF, PAF (Xarelto), HTN, RAUL on CPAP and AMBER NSCLC here today for 6 month follow-up. Post op course prolonged by arrhythmia and air leak. Both resolved prior to discharge. Since discharge, she has recovered well. Monitors HR and BP at home. Resumed xarelto. Denies SOB.     Review of Systems   Constitutional:  Negative for activity change, appetite change, fatigue and fever.   Respiratory:  Negative for chest tightness.    Cardiovascular:  Negative for chest pain.   Gastrointestinal:  Negative for abdominal pain.   Musculoskeletal:  Negative for arthralgias.          Objective     There were no vitals filed for this visit.        Physical Exam  Constitutional:       Appearance: Normal appearance.   HENT:      Head: Atraumatic.   Eyes:      Extraocular Movements: Extraocular movements intact.   Cardiovascular:      Rate and Rhythm: Normal rate. Rhythm irregular.      Pulses: Normal pulses.      Heart sounds: Normal heart sounds.   Pulmonary:      Effort: Pulmonary effort is normal.      Breath sounds: Normal breath sounds.   Abdominal:      General: Abdomen is flat.   Musculoskeletal:         General: Normal range of motion.      Cervical back: Normal range of motion.   Skin:     General: Skin is warm and dry.      Comments: Robotic ports intact   Neurological:      General: No focal deficit present.      Mental Status: She is alert and oriented to person, place, and time.   Psychiatric:         Mood and Affect: Mood normal.         Behavior: Behavior normal.       CXR:    no PTX  No significant pleural effusion    Chest CT 2/5/25:  Thickening at staple line  No mediastinal or hilar lymphadenopathy       Assessment and Plan     63 y.o. female former smoker with NICM recovered EF, PAF (Xarelto), HTN, RAUL on CPAP and AMBER NSCLC here today for 6 month follow-up s/p left robotic sub lobar resection for pT1cN0 adenocarcinoma.    PLAN  RTC in 3 months with chest CT to monitor soft tissue thickening adjacent to staple line

## 2025-02-05 ENCOUNTER — OFFICE VISIT (OUTPATIENT)
Dept: CARDIOTHORACIC SURGERY | Facility: CLINIC | Age: 65
End: 2025-02-05
Attending: THORACIC SURGERY (CARDIOTHORACIC VASCULAR SURGERY)
Payer: COMMERCIAL

## 2025-02-05 ENCOUNTER — HOSPITAL ENCOUNTER (OUTPATIENT)
Dept: RADIOLOGY | Facility: HOSPITAL | Age: 65
Discharge: HOME | End: 2025-02-05
Attending: THORACIC SURGERY (CARDIOTHORACIC VASCULAR SURGERY)
Payer: COMMERCIAL

## 2025-02-05 VITALS
HEART RATE: 91 BPM | DIASTOLIC BLOOD PRESSURE: 95 MMHG | WEIGHT: 175.25 LBS | HEIGHT: 63 IN | OXYGEN SATURATION: 99 % | SYSTOLIC BLOOD PRESSURE: 141 MMHG | BODY MASS INDEX: 31.05 KG/M2

## 2025-02-05 DIAGNOSIS — C34.12 MALIGNANT NEOPLASM OF UPPER LOBE OF LEFT LUNG: Primary | ICD-10-CM

## 2025-02-05 DIAGNOSIS — C34.12 MALIGNANT NEOPLASM OF UPPER LOBE OF LEFT LUNG: ICD-10-CM

## 2025-02-05 PROCEDURE — 1159F MED LIST DOCD IN RCRD: CPT | Mod: CPTII,S$GLB,, | Performed by: THORACIC SURGERY (CARDIOTHORACIC VASCULAR SURGERY)

## 2025-02-05 PROCEDURE — 3077F SYST BP >= 140 MM HG: CPT | Mod: CPTII,S$GLB,, | Performed by: THORACIC SURGERY (CARDIOTHORACIC VASCULAR SURGERY)

## 2025-02-05 PROCEDURE — 71250 CT THORAX DX C-: CPT | Mod: 26,,, | Performed by: RADIOLOGY

## 2025-02-05 PROCEDURE — 71250 CT THORAX DX C-: CPT | Mod: TC

## 2025-02-05 PROCEDURE — 3080F DIAST BP >= 90 MM HG: CPT | Mod: CPTII,S$GLB,, | Performed by: THORACIC SURGERY (CARDIOTHORACIC VASCULAR SURGERY)

## 2025-02-05 PROCEDURE — 99999 PR PBB SHADOW E&M-EST. PATIENT-LVL III: CPT | Mod: PBBFAC,,, | Performed by: THORACIC SURGERY (CARDIOTHORACIC VASCULAR SURGERY)

## 2025-02-05 PROCEDURE — 3008F BODY MASS INDEX DOCD: CPT | Mod: CPTII,S$GLB,, | Performed by: THORACIC SURGERY (CARDIOTHORACIC VASCULAR SURGERY)

## 2025-02-05 PROCEDURE — 99213 OFFICE O/P EST LOW 20 MIN: CPT | Mod: S$GLB,,, | Performed by: THORACIC SURGERY (CARDIOTHORACIC VASCULAR SURGERY)

## 2025-02-05 RX ORDER — METOPROLOL SUCCINATE 100 MG/1
100 TABLET, EXTENDED RELEASE ORAL DAILY
COMMUNITY

## 2025-05-05 ENCOUNTER — HOSPITAL ENCOUNTER (OUTPATIENT)
Dept: RADIOLOGY | Facility: HOSPITAL | Age: 65
Discharge: HOME | End: 2025-05-05
Attending: THORACIC SURGERY (CARDIOTHORACIC VASCULAR SURGERY)
Payer: COMMERCIAL

## 2025-05-05 DIAGNOSIS — C34.12 MALIGNANT NEOPLASM OF UPPER LOBE OF LEFT LUNG: ICD-10-CM

## 2025-05-05 PROCEDURE — 71250 CT THORAX DX C-: CPT | Mod: 26,,, | Performed by: RADIOLOGY

## 2025-05-05 PROCEDURE — 71250 CT THORAX DX C-: CPT | Mod: TC

## 2025-05-07 ENCOUNTER — OFFICE VISIT (OUTPATIENT)
Dept: CARDIOTHORACIC SURGERY | Facility: CLINIC | Age: 65
End: 2025-05-07
Payer: COMMERCIAL

## 2025-05-07 DIAGNOSIS — C34.92 NON-SMALL CELL CANCER OF LEFT LUNG: ICD-10-CM

## 2025-05-07 DIAGNOSIS — C34.12 MALIGNANT NEOPLASM OF UPPER LOBE OF LEFT LUNG: Primary | ICD-10-CM

## 2025-05-07 NOTE — PROGRESS NOTES
Visit type:  Audio only  Patient location: Home  Visit duration: 15 minutes  The patient was informed of (1) the relationship between the physician and the patient as well as the fact that (2) the patient could choose to cancel  telemedicince treatment at any time.    Patient ID: Keri Baca is a 64 y.o. female.     Chief Complaint: No chief complaint on file.     Diagnosis:  NSCLC     Pre-operative therapy: none      Procedure(s) and date(s): left robotic assisted apicoposterior wedge with MLND     Pathology: 2.5 cm well differentiated adenocarcinoma. Levels 5,7,9,10 = negative. pT1cN0. Margins negative     Post-operative therapy: surveillance      HPI  64 y.o.  female former smoker with NICM recovered EF, PAF (Xarelto), HTN, RAUL on CPAP and AMBER NSCLC.  Virtual visit is scheduled today for 6 month follow-up. Post op course prolonged by arrhythmia and air leak. Both resolved prior to discharge. Since discharge, she has recovered well. Monitors HR and BP at home. Resumed xarelto. Denies SOB.  There are no acute thoracic surgical issues.     Review of Systems   Constitutional:  Negative for activity change, appetite change, fatigue and fever.   Respiratory:  Negative for chest tightness.    Cardiovascular:  Negative for chest pain.   Gastrointestinal:  Negative for abdominal pain.   Musculoskeletal:  Negative for arthralgias.      Physical exam: Not performed, virtual visit    Chest CT 5/5/25:  I reviewed the images  No significant interval changes.  Masslike opacity left upper lobe is favored to be a combination postsurgical changes and atelectasis.  Additional small nonspecific lung nodules are unchanged.  Continued follow-up is needed.       Assessment/plan:    The patient is at 10 months status post left robotic assisted apical posterior wedge resection with lymph node dissection.  She has no evidence of recurrent disease  Radiographic imaging shows some consolidative changes adjacent to the resection  site    Continue with close surveillance.    Obtain repeat chest CT in 6 months.

## 2025-08-05 ENCOUNTER — HOSPITAL ENCOUNTER (OUTPATIENT)
Dept: RADIOLOGY | Facility: HOSPITAL | Age: 65
Discharge: HOME | End: 2025-08-05
Attending: NURSE PRACTITIONER
Payer: COMMERCIAL

## 2025-08-05 DIAGNOSIS — Z85.118 PERSONAL HISTORY OF OTHER MALIGNANT NEOPLASM OF BRONCHUS AND LUNG: ICD-10-CM

## 2025-08-05 LAB
CREAT SERPL-MCNC: 0.8 MG/DL (ref 0.5–1.4)
SAMPLE: NORMAL

## 2025-08-05 PROCEDURE — 25500020 PHARM REV CODE 255

## 2025-08-05 PROCEDURE — 71260 CT THORAX DX C+: CPT | Mod: TC

## 2025-08-05 RX ADMIN — IOHEXOL 75 ML: 350 INJECTION, SOLUTION INTRAVENOUS at 10:08

## (undated) DEVICE — KIT ANTIFOG W/SPONG & FLUID

## (undated) DEVICE — NDL SPINAL 18GX3.5 SPINOCAN

## (undated) DEVICE — RELOAD SUREFORM 60 3.5 BLU 6R

## (undated) DEVICE — SET TRI-LUMEN FILTERED TUBE

## (undated) DEVICE — TRAY MINOR GEN SURG OMC

## (undated) DEVICE — STAPLER SUREFORM SGL USE 45

## (undated) DEVICE — CONTAINER SPECIMEN OR STER 4OZ

## (undated) DEVICE — RELOAD GREEN FOR ECHELON

## (undated) DEVICE — SET TUBE DAVINCI 5 HI FLOW INS

## (undated) DEVICE — SOL WATER STRL IRR 1000ML

## (undated) DEVICE — ELECTRODE REM PLYHSV RETURN 9

## (undated) DEVICE — DRAPE ABDOMINAL TIBURON 14X11

## (undated) DEVICE — TUBING SUC UNIV W/CONN 12FT

## (undated) DEVICE — DRESSING TRANS 2X2 TEGADERM

## (undated) DEVICE — SUT 2-0 VICRYL / CT-1

## (undated) DEVICE — STAPLER SUREFORM 60 SPU

## (undated) DEVICE — DRAPE ARM DAVINCI XI

## (undated) DEVICE — SUT SILK 0 STRANDS 30IN BLK

## (undated) DEVICE — DRESSING TRANS 4X4 TEGADERM

## (undated) DEVICE — STAPLER ECHELON FLEX GST 45MM

## (undated) DEVICE — RELOAD SUREFORM 45 4.3 GRN 6R

## (undated) DEVICE — HEMOSTAT SURGICEL NUKNIT 3X4IN

## (undated) DEVICE — SUT VICRYL 3-0 27 SH

## (undated) DEVICE — DRAPE SCOPE PILLOW WARMER

## (undated) DEVICE — CATH THORACIC 28FR ST

## (undated) DEVICE — PORT AIRSEAL 12/120MM LPI

## (undated) DEVICE — SEAL UNIVERSAL 5MM-8MM XI

## (undated) DEVICE — SYR ONLY LUER LOCK 20CC

## (undated) DEVICE — SUT SILK 0 BLK BR FSL 18 IN

## (undated) DEVICE — DRAPE INCISE IOBAN 2 23X17IN

## (undated) DEVICE — DRAPE COLUMN DAVINCI XI

## (undated) DEVICE — CANNULA REDUCER 12-8MM

## (undated) DEVICE — RELOAD SUREFORM 45 4.6 BLK 6R

## (undated) DEVICE — SYR SLIP TIP 20CC

## (undated) DEVICE — SUT MCRYL PLUS 4-0 PS2 27IN

## (undated) DEVICE — COVER LIGHT HANDLE

## (undated) DEVICE — ELECTRODE BLADE INSULATED 1 IN

## (undated) DEVICE — GOWN SMART IMP BREATHABLE XXLG

## (undated) DEVICE — TAPE SILK 3IN

## (undated) DEVICE — GLOVE BIOGEL SKINSENSE PI 7.5

## (undated) DEVICE — CANNULA SEAL 12MM

## (undated) DEVICE — DRESSING SURGICAL 1X3

## (undated) DEVICE — SUT 0 VICRYL / CT-1